# Patient Record
Sex: MALE | Race: BLACK OR AFRICAN AMERICAN | NOT HISPANIC OR LATINO | Employment: OTHER | URBAN - METROPOLITAN AREA
[De-identification: names, ages, dates, MRNs, and addresses within clinical notes are randomized per-mention and may not be internally consistent; named-entity substitution may affect disease eponyms.]

---

## 2018-09-12 ENCOUNTER — OFFICE VISIT CONVERTED (OUTPATIENT)
Dept: FAMILY MEDICINE CLINIC | Facility: CLINIC | Age: 83
End: 2018-09-12
Attending: NURSE PRACTITIONER

## 2018-09-12 ENCOUNTER — CONVERSION ENCOUNTER (OUTPATIENT)
Dept: FAMILY MEDICINE CLINIC | Facility: CLINIC | Age: 83
End: 2018-09-12

## 2019-04-25 ENCOUNTER — OFFICE VISIT CONVERTED (OUTPATIENT)
Dept: CARDIOLOGY | Facility: CLINIC | Age: 84
End: 2019-04-25
Attending: INTERNAL MEDICINE

## 2019-08-08 ENCOUNTER — OFFICE VISIT CONVERTED (OUTPATIENT)
Dept: CARDIOLOGY | Facility: CLINIC | Age: 84
End: 2019-08-08
Attending: INTERNAL MEDICINE

## 2019-08-08 ENCOUNTER — CONVERSION ENCOUNTER (OUTPATIENT)
Dept: CARDIOLOGY | Facility: CLINIC | Age: 84
End: 2019-08-08

## 2019-10-17 ENCOUNTER — HOSPITAL ENCOUNTER (OUTPATIENT)
Dept: GASTROENTEROLOGY | Facility: HOSPITAL | Age: 84
Setting detail: HOSPITAL OUTPATIENT SURGERY
Discharge: HOME | End: 2019-10-17
Attending: INTERNAL MEDICINE

## 2019-11-13 ENCOUNTER — HOSPITAL ENCOUNTER (OUTPATIENT)
Dept: URGENT CARE | Facility: CLINIC | Age: 84
Discharge: HOME | End: 2019-11-13
Attending: PHYSICIAN ASSISTANT

## 2020-04-29 ENCOUNTER — TELEPHONE CONVERTED (OUTPATIENT)
Dept: CARDIOLOGY | Facility: CLINIC | Age: 85
End: 2020-04-29
Attending: INTERNAL MEDICINE

## 2020-05-08 ENCOUNTER — CONVERSION ENCOUNTER (OUTPATIENT)
Dept: SURGERY | Facility: CLINIC | Age: 85
End: 2020-05-08

## 2020-05-08 ENCOUNTER — OFFICE VISIT CONVERTED (OUTPATIENT)
Dept: SURGERY | Facility: CLINIC | Age: 85
End: 2020-05-08
Attending: SURGERY

## 2020-07-14 ENCOUNTER — HOSPITAL ENCOUNTER (OUTPATIENT)
Dept: OTHER | Facility: HOSPITAL | Age: 85
Discharge: HOME | End: 2020-07-14
Attending: INTERNAL MEDICINE

## 2020-09-04 ENCOUNTER — HOSPITAL ENCOUNTER (OUTPATIENT)
Dept: OTHER | Facility: HOSPITAL | Age: 85
Discharge: HOME | End: 2020-09-04
Attending: SURGERY

## 2020-09-04 ENCOUNTER — HOSPITAL ENCOUNTER (OUTPATIENT)
Dept: PREADMISSION TESTING | Facility: HOSPITAL | Age: 85
Discharge: HOME | End: 2020-09-04
Attending: SURGERY

## 2020-09-04 LAB
ANION GAP SERPL CALC-SCNC: 12 MMOL/L (ref 8–19)
BUN SERPL-MCNC: 16 MG/DL (ref 5–25)
BUN/CREAT SERPL: 19 {RATIO} (ref 6–20)
CALCIUM SERPL-MCNC: 9.8 MG/DL (ref 8.7–10.4)
CHLORIDE SERPL-SCNC: 104 MMOL/L (ref 99–111)
CONV CO2: 27 MMOL/L (ref 22–32)
CREAT UR-MCNC: 0.86 MG/DL (ref 0.7–1.2)
GFR SERPLBLD BASED ON 1.73 SQ M-ARVRAT: >60 ML/MIN/{1.73_M2}
GLUCOSE SERPL-MCNC: 90 MG/DL (ref 70–99)
OSMOLALITY SERPL CALC.SUM OF ELEC: 289 MOSM/KG (ref 273–304)
POTASSIUM SERPL-SCNC: 4.2 MMOL/L (ref 3.5–5.3)
SARS-COV-2 RNA SPEC QL NAA+PROBE: NOT DETECTED
SODIUM SERPL-SCNC: 139 MMOL/L (ref 135–147)

## 2020-09-09 ENCOUNTER — HOSPITAL ENCOUNTER (OUTPATIENT)
Dept: PERIOP | Facility: HOSPITAL | Age: 85
Setting detail: HOSPITAL OUTPATIENT SURGERY
Discharge: HOME | End: 2020-09-09
Attending: SURGERY

## 2020-09-22 ENCOUNTER — OFFICE VISIT CONVERTED (OUTPATIENT)
Dept: SURGERY | Facility: CLINIC | Age: 85
End: 2020-09-22
Attending: SURGERY

## 2020-11-11 ENCOUNTER — OFFICE VISIT CONVERTED (OUTPATIENT)
Dept: CARDIOLOGY | Facility: CLINIC | Age: 85
End: 2020-11-11
Attending: INTERNAL MEDICINE

## 2020-11-11 ENCOUNTER — CONVERSION ENCOUNTER (OUTPATIENT)
Dept: OTHER | Facility: HOSPITAL | Age: 85
End: 2020-11-11

## 2021-05-10 ENCOUNTER — OFFICE VISIT CONVERTED (OUTPATIENT)
Dept: CARDIOLOGY | Facility: CLINIC | Age: 86
End: 2021-05-10
Attending: INTERNAL MEDICINE

## 2021-05-10 ENCOUNTER — CONVERSION ENCOUNTER (OUTPATIENT)
Dept: CARDIOLOGY | Facility: CLINIC | Age: 86
End: 2021-05-10

## 2021-05-10 NOTE — H&P
History and Physical      Patient Name: Ramez Pastor   Patient ID: [id number]   Sex: Male   YOB: 1932    Primary Care Provider: Kimberley RAMOS    Visit Date: May 8, 2020    Provider: Issac Barajas MD   Location[facility]   Location Address: [address] [place] [address]  [id number]   Location Phone: [phone]          Chief Complaint  · Outpatient History & Physical / Surgical Orders  · Hernia Consult      History Of Present Illness     Mr. Pastor came in today for evaluation. He is a really nice gentleman that we have taken care of in the past. He has a symptomatic left inguinal hernia. At times, it is a bit sore. He would like to consider getting this fixed a little bit later this year.       Past Medical History  Arthritis; Cancer of prostate; Cataract; Glaucoma; Head injury; Hypertension; Shortness of Breath; Sinus trouble         Past Surgical History  Colectomy with end colostomy; Cystoscopy; EGD; Exploratory laparotomy; EYE SURGERY; Transurethral resection of the prostate         Medication List  amlodipine 5 mg oral tablet; back brace miscellaneous misc; bimatoprost 0.03 % ophthalmic (eye) drops; Butt Paste Zinc/HC1%/Clotrim 1%; Cosopt (PF) 2-0.5 % ophthalmic (eye) dropperette; diclofenac sodium 75 mg oral tablet,delayed release (DR/EC); Eligard (6 month) 45 mg subcutaneous syringe; Entresto 24-26 mg oral tablet; fluticasone 50 mcg/actuation nasal spray,suspension; furosemide 40 mg oral tablet; Men's Multivitamin 400- mcg oral tablet; Oysco 500/D 500 mg(1,250mg) -200 unit oral tablet; potassium chloride 10 mEq oral capsule, extended release; ProAir HFA 90 mcg/actuation inhalation HFA aerosol inhaler; Puralube 85-15 % ophthalmic (eye) ointment; Refresh Tears 0.5 % ophthalmic (eye) drops         Allergy List  NO KNOWN DRUG ALLERGIES         Family Medical History  *No Known Family History         Social History  Alcohol (Never); lives alone; ; Retired [employer];  "Tobacco (Never)         Review of Systems  · Integument  o Admits  o : skin lesion or lump      Vitals  Date Time BP Position Site L\R Cuff Size HR RR TEMP (F) WT  HT  BMI kg/m2 BSA m2 O2 Sat        05/08/2020 10:03 AM       12  130lbs 0oz 5'  9\" 19.2 1.69           Physical Examination  · Constitutional  o Appearance  o : well-nourished, well developed, alert, in no acute distress  · Head and Face  o Head  o :   § Inspection  § : atraumatic, normocephalic  · Neck  o Inspection/Palpation  o : supple, normal range of motion  · Respiratory  o Inspection of Chest  o : normal inspection  o Auscultation of Lungs  o : breath sounds normal, no distress, clear to ascultate bilaterally  · Cardiovascular  o Heart  o :   § Auscultation of Heart  § : regular rate and rhythm, no murmur, gallop or rub  · Gastrointestinal  o Abdominal Examination  o : normal bowel sounds, non-tender, soft  · Groin  o Groin  o : LIH noted          Assessment  · Pre-Surgical Orders     V72.84  · Non-recurrent unilateral inguinal hernia without obstruction or gangrene     550.90/K40.90      Plan  · Orders  o Surgery Order (GEN[facility]) - 550.90/K40.90 - 09/09/2020  · Medications  o Medications have been Reconciled  o Transition of Care or Provider Policy  · Instructions  o PLAN:  o Handouts Provided-Pre-Procedure Instructions including date and time and location of procedure.  o Surgical Facility: [facility]  o ****Surgical Orders****  o ****Patient Status****  o Outpatient  o RISK AND BENEFITS:  o Consent for surgery: Given these options, the patient has verbally expressed an understanding of the risks of surgery and finds these risks acceptable. We will proceed with surgery as soon as possible.  o Consult Anesthesia for any post-operative block, or any pain management procedure deemed necessary by the anestesiologist for adequate post-operative pain control.   o O.R. PREP: Per protocol  o SCD's preoperatively  o PLEASE SIGN PERMIT FOR: " Left inguinal hernia repair  o *__Kefzol 2 gram IV on call to OR.  o *___The above History and Physical Examination has been completed within 30 days of admission.  o Electronically Identified Patient Education Materials Provided Electronically     We are going to set him up for an open repair of his inguinal hernia. He has had a prior midline laparotomy for a cecal volvulus. I have described the procedure to him as well as the risks and benefits and he is agreeable to proceeding.             Electronically Signed by: Maddy Hou-, -Author on May 11, 2020 02:20:48 PM  Electronically Co-signed by: Issac Barajas MD -Reviewer on May 12, 2020 02:14:07 PM

## 2021-05-13 NOTE — PROGRESS NOTES
"   Progress Note      Patient Name: Ramez Pastor   Patient ID: [id number]   Sex: Male   YOB: 1932    Primary Care Provider: Kimberley RAMOS   Referring Provider: Kolby Jarrett MD    Visit Date: November 11, 2020    Provider: Kolby Jarrett MD   Location: [facility]   Location Address: [address] [place] [address], [facility]   [place]  [id number]   Location Phone: [phone]          Chief Complaint  · Congestive heart failure       History Of Present Illness  REFERRING CARE PROVIDER: Kolby Jarrett MD   Ramez Pastor is an 88 year old /Black gentleman with a known history of congestive heart failure probable non-ischemic, hypertension and ICD, who has been doing fairly well. Stable shortness of breath on exertion symptoms. No weight gain or edema problems.   PAST MEDICAL HISTORY: Arthritis; Congestive heart failure, systolic; Hypertension; ICD implantation; Prostate cancer.   PSYCHOSOCIAL HISTORY: He never used alcohol or tobacco.   CURRENT MEDICATIONS: include Lasix 80 mg daily; Potassium 10 mEq daily; Coreg 6.25 mg b.i.d; Vitamin D3; Entresto 24/26 mg b.i.d.; acetaminophen. The dosage and frequency of the medications were reviewed with the patient.       Review of Systems  · Cardiovascular  o Denies  o : palpitations (fast, fluttering, or skipping beats), swelling (feet, ankles, hands), shortness of breath while walking or lying flat, chest pain or angina pectoris   · Respiratory  o Denies  o : chronic or frequent cough, asthma or wheezing      Vitals  Date Time BP Position Site L\R Cuff Size HR RR TEMP (F) WT  HT  BMI kg/m2 BSA m2 O2 Sat FR L/min FiO2 HC       11/11/2020 11:03 /82 Sitting    96 - R   129lbs 0oz 5'  9\" 19.05 1.69       11/11/2020 11:03 /84 Sitting                       Physical Examination  · Constitutional  o Appearance  o : Awake, alert, in no acute distress.   · Eyes  o Conjunctivae  o : Normal.  · Ears, Nose, Mouth and Throat  o Oral Cavity  o : "   § Oral Mucosa  § : Normal.  · Neck  o Inspection/Palpation  o : No JVD. Good carotid upstroke. No thyromegaly.  · Respiratory  o Respiratory  o : Good respiratory effort. Clear to percussion and auscultation.  · Cardiovascular  o Heart  o :   § Auscultation of Heart  § : S1, S2 normal. Regular rate and rhythm without murmurs, gallops, or rubs.  o Peripheral Vascular System  o :   § Extremities  § : Good femoral and pedal pulses. Trace lower-extremity edema bilaterally.  · Gastrointestinal  o Abdominal Examination  o : Soft. No tenderness or masses felt. No hepatosplenomegaly. Abdominal aorta is not palpable.     His dual-chamber ICD was interrogated.  The right atrial lead was paced 9% of the time and working normally.  The right ventricular lead was paced 100% of the time and working normally.  No changes were made to the device.  No events recorded.           Assessment     ASSESSMENT AND PLAN:    1.  Non-ischemic cardiomyopathy:  Class II to III symptoms.  No heart failure admissions since his last visit. Will       increase Entresto to 49/51 mg b.i.d.  Repeat a renal panel in 2 weeks.  2. ICD, dual-chamber, working properly:  Repeat interrogation in 6 months.    MD Silvia Luque/holden         This note was transcribed by Julianna Whitman.  holden/silvia   The above service was transcribed by Julianna Whitman, and I attest to the accuracy of the note.  SILVIA.               Electronically Signed by: Julianna Whitman-, -Author on November 16, 2020 10:08:36 AM  Electronically Co-signed by: Kolby Jarrett MD -Reviewer on November 18, 2020 04:08:04 PM

## 2021-05-13 NOTE — PROGRESS NOTES
Progress Note      Patient Name: Ramez Pastor   Patient ID: [id number]   Sex: Male   YOB: 1932    Primary Care Provider: Kimberley RAMOS    Visit Date: September 22, 2020    Provider: Issac Barajas MD   Location: [facility]   Location Address: [address] [place] [address]  [id number]   Location Phone: [phone]          Chief Complaint  · Follow Up Office Visit      History Of Present Illness     Mr. Pastor came back for follow-up. He is doing well following a left inguinal hernia. He denies any pain or any other troubles.       Past Medical History  Arthritis; Cancer of prostate; Cataract; Glaucoma; Head injury; Hypertension; Shortness of Breath; Sinus trouble         Past Surgical History  Colectomy with end colostomy; Cystoscopy; EGD; Exploratory laparotomy; EYE SURGERY; Transurethral resection of the prostate         Medication List  amlodipine 5 mg oral tablet; back brace miscellaneous misc; bimatoprost 0.03 % ophthalmic (eye) drops; Butt Paste Zinc/HC1%/Clotrim 1%; Cosopt (PF) 2-0.5 % ophthalmic (eye) dropperette; diclofenac sodium 75 mg oral tablet,delayed release (DR/EC); Eligard (6 month) 45 mg subcutaneous syringe; Entresto 24-26 mg oral tablet; fluticasone 50 mcg/actuation nasal spray,suspension; furosemide 40 mg oral tablet; Men's Multivitamin 400- mcg oral tablet; Oysco 500/D 500 mg(1,250mg) -200 unit oral tablet; potassium chloride 10 mEq oral capsule, extended release; ProAir HFA 90 mcg/actuation inhalation HFA aerosol inhaler; Puralube 85-15 % ophthalmic (eye) ointment; Refresh Tears 0.5 % ophthalmic (eye) drops         Allergy List  NO KNOWN DRUG ALLERGIES         Family Medical History  *No Known Family History         Social History  Alcohol (Never); lives alone; ; Retired [employer]; Tobacco (Never)         Review of Systems  · Cardiovascular  o Denies  o : chest pain, irregular heart beats, rapid heart rate, chest pain on exertion,  "shortness of breath, lower extremity swelling  · Respiratory  o Denies  o : shortness of breath, wheezing, cough, wheezing, chronic cough, coughing up blood  · Gastrointestinal  o Denies  o : nausea, vomiting, diarrhea, chronic abdominal pain, reflux symptoms      Vitals  Date Time BP Position Site L\R Cuff Size HR RR TEMP (F) WT  HT  BMI kg/m2 BSA m2 O2 Sat HC       09/22/2020 03:05 PM       16  129lbs 0oz 5'  9\" 19.05 1.69           Physical Examination     Today on physical exam, the incision looks good. It is healing up fine.           Assessment  · Postoperative Exam Following Surgery     V67.00       Doing well status post left inguinal hernia repair.       Plan  · Medications  o Medications have been Reconciled  o Transition of Care or Provider Policy  · Instructions  o Follow up as needed.            Electronically Signed by: Maddy Hou-, -Author on September 23, 2020 12:12:04 PM  Electronically Co-signed by: Issac Barajas MD -Reviewer on September 25, 2020 09:09:17 AM  "

## 2021-05-13 NOTE — PROGRESS NOTES
Progress Note      Patient Name: Ramez Pastor   Patient ID: [id number]   Sex: Male   YOB: 1932    Primary Care Provider: Kimberley RAMOS   Referring Provider: Kimberley RAMOS    Visit Date: April 29, 2020    Provider: Kolby Jarrett MD   Location: [facility]   Location Address: [address] [place] [address], [facility]   [place]  [id number]   Location Phone: [phone]          Chief Complaint  · Congestive heart failure.       History Of Present Illness  Video Conferencing Visit  Ramez Pastor is an 87 year old /Black male with systolic congestive heart failure, hypertension, and an ICD who is doing well. He has stable shortness of breath, chronic palpitations, and mild lower extremity edema. No chest pain. He is presenting for evaluation via video conferencing. Verbal consent obtained before beginning visit. Telehealth visit due to COVID-19.   The following staff were present during this visit: Provider only.   PAST MEDICAL HISTORY: Arthritis; Congestive heart failure, systolic; Hypertension; ICD implantation; Prostate cancer.   FAMILY HISTORY: Positive for hypertension. Negative for diabetes mellitus or heart disease.   PSYCHOSOCIAL HISTORY: Denies mood changes or depression. Denies alcohol or tobacco use. Does push-ups and jogs for exercise.   CURRENT MEDICATIONS: Lasix 80 mg daily; potassium chloride 10 mEq daily; Coreg 6.25 mg b.i.d.; vitamin D3 1,000 units daily; Entresto 24-26 mg b.i.d.; acetaminophen 500 mg 2 tablets t.i.d. The dosage and frequency of the medications were reviewed with the patient.       Review of Systems  · Cardiovascular  o Admits  o : palpitations (fast, fluttering, or skipping beats), swelling (feet, ankles, hands), shortness of breath while walking or lying flat  o Denies  o : chest pain or angina pectoris   · Respiratory  o Denies  o : chronic or frequent cough, asthma or wheezing      Vitals     Patient unable to obtain at-home  vitals.           Assessment     ASSESSMENT & PLAN:    1.  Congestive heart failure, systolic, class III symptoms, stable.  Continue with his current dose of Lasix, as         well as his other medications.  Counseled on keeping a daily weight and blood pressure log.    2.  ICD, previously working well.  Repeat interrogation on his followup visit in 6 months.  3.  Hypertension.  Counseled him on getting a home blood pressure log for monitoring.                 Electronically Signed by: Abril Taylor-, Other -Author on May 4, 2020 09:15:33 AM  Electronically Co-signed by: Kolby Jarrett MD -Reviewer on May 5, 2020 09:04:56 AM

## 2021-05-14 VITALS — WEIGHT: 129 LBS | RESPIRATION RATE: 16 BRPM | HEIGHT: 69 IN | BODY MASS INDEX: 19.11 KG/M2

## 2021-05-14 VITALS
WEIGHT: 129 LBS | SYSTOLIC BLOOD PRESSURE: 148 MMHG | HEART RATE: 96 BPM | BODY MASS INDEX: 19.11 KG/M2 | HEIGHT: 69 IN | DIASTOLIC BLOOD PRESSURE: 82 MMHG

## 2021-05-15 VITALS
HEART RATE: 70 BPM | DIASTOLIC BLOOD PRESSURE: 88 MMHG | BODY MASS INDEX: 19.11 KG/M2 | WEIGHT: 129 LBS | SYSTOLIC BLOOD PRESSURE: 144 MMHG | HEIGHT: 69 IN

## 2021-05-15 VITALS
DIASTOLIC BLOOD PRESSURE: 74 MMHG | HEIGHT: 69 IN | SYSTOLIC BLOOD PRESSURE: 126 MMHG | WEIGHT: 130 LBS | HEART RATE: 58 BPM | BODY MASS INDEX: 19.26 KG/M2

## 2021-05-15 VITALS — RESPIRATION RATE: 12 BRPM | WEIGHT: 130 LBS | HEIGHT: 69 IN | BODY MASS INDEX: 19.26 KG/M2

## 2021-05-16 VITALS
DIASTOLIC BLOOD PRESSURE: 70 MMHG | SYSTOLIC BLOOD PRESSURE: 132 MMHG | HEIGHT: 69 IN | WEIGHT: 130.5 LBS | SYSTOLIC BLOOD PRESSURE: 140 MMHG | OXYGEN SATURATION: 94 % | DIASTOLIC BLOOD PRESSURE: 70 MMHG | TEMPERATURE: 97.6 F | HEART RATE: 76 BPM | BODY MASS INDEX: 19.33 KG/M2

## 2021-06-05 NOTE — PROGRESS NOTES
"   Progress Note      Patient Name: Ramez Pastor   Patient ID: [id number]   Sex: Male   YOB: 1932    Primary Care Provider: Kimberley RAMOS    Visit Date: May 10, 2021    Provider: Kolby Jarrett MD   Location: [facility]   Location Address: [address] [place] [address], [facility]   [place]  [id number]   Location Phone: [phone]          Chief Complaint     CHF.       History Of Present Illness  REFERRING CARE PROVIDER: REFERRING CARE PROVIDER NAME   Ramez Pastor is a 88 year old /Black male with a history of systolic congestive heart failure, nonischemic hypertension and ICD who has been doing. Stable symptoms of shortness of breath. No weight gain or edema. No ICD discharges.   PAST MEDICAL HISTORY: Arthritis; Congestive heart failure, systolic; Hypertension; ICD implantation; Prostate cancer.   PSYCHOSOCIAL HISTORY: Denies alcohol consumption. Denies tobacco use.   CURRENT MEDICATIONS: Medications have been reviewed and are as stated.      ALLERGIES:  No known drug allergies.       Review of Systems  · Cardiovascular  o Admits  o : shortness of breath while walking or lying flat  o Denies  o : palpitations (fast, fluttering, or skipping beats), swelling (feet, ankles, hands), chest pain or angina pectoris   · Respiratory  o Denies  o : chronic or frequent cough      Vitals  Date Time BP Position Site L\R Cuff Size HR RR TEMP (F) WT  HT  BMI kg/m2 BSA m2 O2 Sat FR L/min FiO2 HC       05/10/2021 12:07 /80 Sitting    90 - R   124lbs 0oz 5'  9\" 18.31 1.65             Physical Examination  · Constitutional  o Appearance  o : Awake, alert, in no acute distress.   · Eyes  o Conjunctivae  o : Normal.  · Ears, Nose, Mouth and Throat  o Oral Cavity  o :   § Oral Mucosa  § : Normal.  · Neck  o Inspection/Palpation  o : No JVD. Good carotid upstroke. No thyromegaly.  · Respiratory  o Respiratory  o : Good respiratory effort. Clear to percussion and " auscultation.  · Cardiovascular  o Heart  o :   § Auscultation of Heart  § : S1, S2 normal. Regular rate and rhythm without murmurs, gallops, or rubs.  o Peripheral Vascular System  o :   § Extremities  § : Good femoral and pedal pulses. No pedal edema.  · Gastrointestinal  o Abdominal Examination  o : Soft. No tenderness or masses felt. No hepatosplenomegaly. Abdominal aorta is not palpable.  · Device Interrogation  o Device Interrogation  o : ICD dual chamber was interrogated by me. Found to be working properly. No changes made in the device.          Assessment     1.  Nonischemic cardiomyopathy, stable symptoms. Continue with his Coreg 3.125 b.i.d. and Entresto 24-26 b.i.d. dosing as well as Lasix 80 mg for volume control. I recommend keeping daily weight logs.   2.  ICD interrogated by me, working properly, repeat interrogation in 6 months.         Kolby Jarrett MD  /               Electronically Signed by: Lyn Gaston-, OT -Author on May 11, 2021 06:33:11 AM  Electronically Co-signed by: Kolby Jarrett MD -Reviewer on May 12, 2021 03:38:56 PM

## 2021-07-15 VITALS
BODY MASS INDEX: 18.37 KG/M2 | SYSTOLIC BLOOD PRESSURE: 138 MMHG | WEIGHT: 124 LBS | DIASTOLIC BLOOD PRESSURE: 80 MMHG | HEART RATE: 90 BPM | HEIGHT: 69 IN

## 2021-11-22 RX ORDER — SACUBITRIL AND VALSARTAN 24; 26 MG/1; MG/1
TABLET, FILM COATED ORAL
Qty: 180 TABLET | Refills: 1 | Status: SHIPPED | OUTPATIENT
Start: 2021-11-22 | End: 2021-12-20 | Stop reason: SDUPTHER

## 2021-11-22 NOTE — TELEPHONE ENCOUNTER
Patient last OV 05.10.21     Medication was documented at that visit.       Next OV   none schedule

## 2021-12-20 RX ORDER — SACUBITRIL AND VALSARTAN 24; 26 MG/1; MG/1
1 TABLET, FILM COATED ORAL 2 TIMES DAILY
Qty: 180 TABLET | Refills: 1 | Status: SHIPPED | OUTPATIENT
Start: 2021-12-20 | End: 2022-01-20 | Stop reason: SDUPTHER

## 2021-12-20 NOTE — TELEPHONE ENCOUNTER
Patient last OV 05.10.21     Medication was documented at that visit.       Next OV none scheduled

## 2022-01-20 RX ORDER — SACUBITRIL AND VALSARTAN 24; 26 MG/1; MG/1
1 TABLET, FILM COATED ORAL 2 TIMES DAILY
Qty: 180 TABLET | Refills: 1 | Status: SHIPPED | OUTPATIENT
Start: 2022-01-20 | End: 2022-08-11 | Stop reason: SDUPTHER

## 2022-02-20 PROBLEM — I50.22 CHRONIC HFREF (HEART FAILURE WITH REDUCED EJECTION FRACTION): Status: ACTIVE | Noted: 2022-02-20

## 2022-02-20 PROBLEM — I10 ESSENTIAL HYPERTENSION: Status: ACTIVE | Noted: 2022-02-20

## 2022-02-20 PROBLEM — Z95.810 ICD (IMPLANTABLE CARDIOVERTER-DEFIBRILLATOR) IN PLACE: Status: ACTIVE | Noted: 2022-02-20

## 2022-02-22 ENCOUNTER — CLINICAL SUPPORT NO REQUIREMENTS (OUTPATIENT)
Dept: CARDIOLOGY | Facility: CLINIC | Age: 87
End: 2022-02-22

## 2022-02-22 ENCOUNTER — OFFICE VISIT (OUTPATIENT)
Dept: CARDIOLOGY | Facility: CLINIC | Age: 87
End: 2022-02-22

## 2022-02-22 VITALS
BODY MASS INDEX: 19.7 KG/M2 | HEIGHT: 69 IN | HEART RATE: 67 BPM | SYSTOLIC BLOOD PRESSURE: 125 MMHG | DIASTOLIC BLOOD PRESSURE: 91 MMHG | WEIGHT: 133 LBS

## 2022-02-22 DIAGNOSIS — I50.22 CHRONIC HFREF (HEART FAILURE WITH REDUCED EJECTION FRACTION): ICD-10-CM

## 2022-02-22 DIAGNOSIS — Z95.810 ICD (IMPLANTABLE CARDIOVERTER-DEFIBRILLATOR) IN PLACE: Primary | ICD-10-CM

## 2022-02-22 DIAGNOSIS — I10 ESSENTIAL HYPERTENSION: ICD-10-CM

## 2022-02-22 DIAGNOSIS — I44.2 CHB (COMPLETE HEART BLOCK): ICD-10-CM

## 2022-02-22 DIAGNOSIS — I50.22 CHRONIC HFREF (HEART FAILURE WITH REDUCED EJECTION FRACTION): Primary | ICD-10-CM

## 2022-02-22 DIAGNOSIS — Z95.810 ICD (IMPLANTABLE CARDIOVERTER-DEFIBRILLATOR) IN PLACE: ICD-10-CM

## 2022-02-22 PROCEDURE — 93283 PRGRMG EVAL IMPLANTABLE DFB: CPT | Performed by: INTERNAL MEDICINE

## 2022-02-22 PROCEDURE — 99214 OFFICE O/P EST MOD 30 MIN: CPT | Performed by: INTERNAL MEDICINE

## 2022-02-22 RX ORDER — DICLOFENAC SODIUM 75 MG/1
TABLET, DELAYED RELEASE ORAL 2 TIMES DAILY
COMMUNITY
End: 2023-03-01

## 2022-02-22 RX ORDER — CALCIUM CARBONATE/VITAMIN D3 500MG-5MCG
TABLET ORAL
COMMUNITY

## 2022-02-22 RX ORDER — PSEUDOEPHED/ACETAMINOPH/DIPHEN 30MG-500MG
TABLET ORAL AS NEEDED
COMMUNITY
Start: 2021-12-21

## 2022-02-22 RX ORDER — POTASSIUM CHLORIDE 750 MG/1
10 CAPSULE, EXTENDED RELEASE ORAL DAILY
COMMUNITY
Start: 2021-04-01

## 2022-02-22 RX ORDER — LEUPROLIDE ACETATE 45 MG
45 KIT SUBCUTANEOUS
COMMUNITY
End: 2023-03-01

## 2022-02-22 RX ORDER — ALBUTEROL SULFATE 90 UG/1
AEROSOL, METERED RESPIRATORY (INHALATION) AS NEEDED
COMMUNITY

## 2022-02-22 RX ORDER — BROMPHENIRAMIN/PSEUDOEPHEDRINE 12MG-120MG
1 CAPSULE, EXTENDED RELEASE ORAL
COMMUNITY

## 2022-02-22 RX ORDER — LIGHT MINERAL OIL, WHITE PETROLATUM 150; 850 MG/G; MG/G
OINTMENT OPHTHALMIC
COMMUNITY

## 2022-02-22 RX ORDER — LORATADINE 10 MG/1
TABLET ORAL DAILY
COMMUNITY
Start: 2021-12-21

## 2022-02-22 RX ORDER — CHLORHEXIDINE GLUCONATE 0.12 MG/ML
RINSE ORAL
COMMUNITY
Start: 2021-11-23

## 2022-02-22 RX ORDER — BIMATOPROST 0.3 MG/ML
1 SOLUTION/ DROPS OPHTHALMIC
COMMUNITY
End: 2023-03-01

## 2022-02-22 RX ORDER — FLUTICASONE PROPIONATE 50 MCG
SPRAY, SUSPENSION (ML) NASAL
COMMUNITY

## 2022-02-22 RX ORDER — CARVEDILOL 3.12 MG/1
3.12 TABLET ORAL 2 TIMES DAILY
COMMUNITY
Start: 2022-01-06

## 2022-02-22 RX ORDER — CARBOXYMETHYLCELLULOSE SODIUM 5 MG/ML
1 SOLUTION/ DROPS OPHTHALMIC
COMMUNITY

## 2022-02-22 NOTE — ASSESSMENT & PLAN NOTE
ICD working properly no changes made device repeat interrogation in 1 year with routine home monitoring

## 2022-02-22 NOTE — PROGRESS NOTES
Normal Dual ICD Device Interrogation and Device Testing.  Normal evaluation of device function and lead measurements.  No optimization was needed of parameters or maximization of device longevity.  Patient is on automated Home Remote Monitoring. Remaining battery is 5 years and he is Dependent.

## 2022-02-22 NOTE — PROGRESS NOTES
Chief Complaint  Hypertension, Congestive Heart Failure, and ICD    Subjective    Patient stable from a respiratory standpoint no issues with weight gain    Past Medical History:   Diagnosis Date   • Arthritis    • Back injury    • Bronchitis    • Cataract    • CHF (congestive heart failure) (HCC)    • Circulatory disorder    • Colon polyp    • Enlarged prostate    • Genitourinary disorder     UTI SEVERAL YRS AGO INCONTINENCE   • Glaucoma    • Hemorrhoids    • Hypertension     ON MEDS   • Musculoskeletal disorder    • Myocardial infarction (HCC)    • Neurologic disorder     SHRAPNAL IN [place]   • Pneumonia    • Seizures (HCC)     WITH HEAD INJURY IN [place]   • Skin problem     BLISTERING FROM AGENT ORANGE AFTER [place]   • Syncopal episodes          Current Outpatient Medications:   •  Acetaminophen Extra Strength 500 MG tablet, As Needed., Disp: , Rfl:   •  albuterol sulfate HFA (ProAir HFA) 108 (90 Base) MCG/ACT inhaler, As Needed., Disp: , Rfl:   •  artificial tears (PURALUBE) 85-15 % ointment ophthalmic ointment, Puralube 85-15 % ophthalmic (eye) ointment apply a small amount to affected eye  once a day (at bedtime)   Active, Disp: , Rfl:   •  bimatoprost (LUMIGAN) 0.03 % ophthalmic drops, 1 drop., Disp: , Rfl:   •  Calcium Carb-Cholecalciferol (OYSCO 500 + D) 500-200 MG-UNIT tablet, Oysco 500/D 500 mg(1,250mg) -200 unit oral tablet take 1 tablet by oral route 2 times a day   Active, Disp: , Rfl:   •  carboxymethylcellulose (REFRESH PLUS) 0.5 % solution, 1 drop., Disp: , Rfl:   •  carvedilol (COREG) 3.125 MG tablet, Take 3.125 mg by mouth 2 (Two) Times a Day., Disp: , Rfl:   •  chlorhexidine (PERIDEX) 0.12 % solution, , Disp: , Rfl:   •  diclofenac (VOLTAREN) 75 MG EC tablet, 2 (Two) Times a Day., Disp: , Rfl:   •  Dorzolamide HCl-Timolol Mal PF (Cosopt PF) 22.3-6.8 MG/ML ophthalmic solution, 1 drop., Disp: , Rfl:   •  fluticasone (FLONASE) 50 MCG/ACT nasal spray, fluticasone 50 mcg/actuation nasal  "spray,suspension spray 2 sprays (100 mcg) in each nostril by intranasal route once daily   Active, Disp: , Rfl:   •  leuprolide (Eligard) 45 MG injection, 45 mg., Disp: , Rfl:   •  loratadine (CLARITIN) 10 MG tablet, Daily., Disp: , Rfl:   •  Nutritional Supplements (ENSURE COMPLETE PO), Take  by mouth 2 (Two) Times a Day As Needed., Disp: , Rfl:   •  potassium chloride (MICRO-K) 10 MEQ CR capsule, Take 10 mEq by mouth Daily., Disp: , Rfl:   •  sacubitril-valsartan (Entresto) 24-26 MG tablet, Take 1 tablet by mouth 2 (Two) Times a Day., Disp: 180 tablet, Rfl: 1    There are no discontinued medications.  No Known Allergies     Social History     Tobacco Use   • Smoking status: Never Smoker   • Smokeless tobacco: Never Used   Vaping Use   • Vaping Use: Never used   Substance Use Topics   • Alcohol use: Never   • Drug use: Never       Family History   Problem Relation Age of Onset   • Asthma Mother    • Prostate cancer Father         Objective     /91   Pulse 67   Ht 175.3 cm (69\")   Wt 60.3 kg (133 lb)   BMI 19.64 kg/m²       Physical Exam    General Appearance:   · no acute distress  · Alert and oriented x3  HENT:   · lips not cyanotic  · Atraumatic  Neck:  · No jvd   · supple  Respiratory:  · no respiratory distress  · normal breath sounds  · no rales  Cardiovascular:  · Regular rate and rhythm  · no S3, no S4   · no murmur  · no rub  Extremities  · No cyanosis  · lower extremity edema: Mild  Skin:   · warm, dry  · No rashes      Result Review :     No results found for: PROBNP       No results found for: TSH   No results found for: FREET4   No results found for: DDIMERQUANT  Magnesium   Date Value Ref Range Status   02/04/2019 2.06 1.60 - 2.30 mg/dL Final      No results found for: DIGOXIN   Lab Results   Component Value Date    TROPONINT 0.01 01/12/2021             Lab Results   Component Value Date    POCTROP 0.10 (H) 04/09/2019     Dual-chamber ICD was interrogated with right atrial lead pacing 6% time " working normally the right ventricular is paced 98.3% time working normally battery life estimated 5.1 years no events recorded                   Diagnoses and all orders for this visit:    1. Chronic HFrEF (heart failure with reduced ejection fraction) ([facility]) (Primary)  Assessment & Plan:  Patient stable from a volume standpoint and symptoms continue with her current dose of Entresto 24-26 twice daily and Coreg 3.25 twice daily.      2. Essential hypertension  Assessment & Plan:  Well-controlled on her dose of Coreg 3.125 twice daily and Entresto 2426 twice daily dosing      3. ICD (implantable cardioverter-defibrillator) in place  Assessment & Plan:  ICD working properly no changes made device repeat interrogation in 1 year with routine home monitoring            Follow Up     Return in about 6 months (around 8/22/2022).          Patient was given instructions and counseling regarding his condition or for health maintenance advice. Please see specific information pulled into the AVS if appropriate.

## 2022-07-18 RX ORDER — FUROSEMIDE 80 MG
TABLET ORAL
COMMUNITY
Start: 2022-04-13

## 2022-07-18 RX ORDER — DORZOLAMIDE HYDROCHLORIDE AND TIMOLOL MALEATE 20; 5 MG/ML; MG/ML
SOLUTION/ DROPS OPHTHALMIC
COMMUNITY
Start: 2022-04-15

## 2022-07-18 RX ORDER — MELATONIN
COMMUNITY
Start: 2022-06-28

## 2022-07-18 RX ORDER — BICALUTAMIDE 50 MG/1
TABLET, FILM COATED ORAL
COMMUNITY
Start: 2022-06-27

## 2022-07-18 RX ORDER — DOCUSATE SODIUM 50 MG AND SENNOSIDES 8.6 MG 8.6; 5 MG/1; MG/1
TABLET, FILM COATED ORAL
COMMUNITY
Start: 2022-06-28

## 2022-07-18 RX ORDER — MEGESTROL ACETATE 20 MG/1
TABLET ORAL
COMMUNITY
Start: 2022-06-29

## 2022-08-01 ENCOUNTER — OFFICE VISIT (OUTPATIENT)
Dept: SURGERY | Facility: CLINIC | Age: 87
End: 2022-08-01

## 2022-08-01 DIAGNOSIS — K40.90 RIGHT INGUINAL HERNIA: Primary | ICD-10-CM

## 2022-08-01 PROCEDURE — 99212 OFFICE O/P EST SF 10 MIN: CPT | Performed by: SURGERY

## 2022-08-01 NOTE — PROGRESS NOTES
Inpatient History and Physical Surgical Orders    Preadmission Location:   Preadmission Time:  Facility:  Surgery Date:  Surgery Time:  Preadmission Test date:     Chief Complaint  Outpatient History and Physical / Surgical Orders    Primary Care Provider: Vannesa Frankel NP-C    Referring Provider: Vannesa Frankel NP-C    Subjective      Patient Name: Ramez Pastor : 1932    HPI  The patient is a 89-year-old gentleman that we have taken care of in the past.  He had a open left inguinal hernia repair done about 2 years ago.  He is done fine from that but is developed a new hernia in the right inguinal canal.  It is not terribly symptomatic.  He occasionally will have a little discomfort there but apparently it does not bother him that often.    Past History:  Medical History: has a past medical history of Arthritis, Back injury, Bronchitis, Cataract, CHF (congestive heart failure) (HCC), Circulatory disorder, Colon polyp, Enlarged prostate, Genitourinary disorder, Glaucoma, Hemorrhoids, Hypertension, Musculoskeletal disorder, Myocardial infarction (HCC), Neurologic disorder, Pneumonia, Seizures (HCC), Skin problem, and Syncopal episodes.   Surgical History: has a past surgical history that includes Mouth surgery; Cataract extraction (Right); Cardiac catheterization (); Abdominal surgery; Hemorrhoid surgery; Cholecystectomy; TURP / transurethral incision / drainage prostate; Leg Surgery; and Cardiac defibrillator placement.   Family History: family history includes Asthma in his mother; Prostate cancer in his father.   Social History: reports that he has never smoked. He has never used smokeless tobacco. He reports that he does not drink alcohol and does not use drugs.  Allergies: Patient has no known allergies.       Current Outpatient Medications:   •  Acetaminophen Extra Strength 500 MG tablet, As Needed., Disp: , Rfl:   •  albuterol sulfate  (90 Base) MCG/ACT inhaler, As Needed., Disp: , Rfl:    •  artificial tears (PURALUBE) 85-15 % ointment ophthalmic ointment, Puralube 85-15 % ophthalmic (eye) ointment apply a small amount to affected eye  once a day (at bedtime)   Active, Disp: , Rfl:   •  bicalutamide (CASODEX) 50 MG chemo tablet, , Disp: , Rfl:   •  bimatoprost (LUMIGAN) 0.03 % ophthalmic drops, 1 drop., Disp: , Rfl:   •  Calcium Carb-Cholecalciferol (OYSCO 500 + D) 500-200 MG-UNIT tablet, Oysco 500/D 500 mg(1,250mg) -200 unit oral tablet take 1 tablet by oral route 2 times a day   Active, Disp: , Rfl:   •  carboxymethylcellulose (REFRESH PLUS) 0.5 % solution, 1 drop., Disp: , Rfl:   •  carvedilol (COREG) 3.125 MG tablet, Take 3.125 mg by mouth 2 (Two) Times a Day., Disp: , Rfl:   •  chlorhexidine (PERIDEX) 0.12 % solution, , Disp: , Rfl:   •  cholecalciferol (VITAMIN D3) 25 MCG (1000 UT) tablet, , Disp: , Rfl:   •  diclofenac (VOLTAREN) 75 MG EC tablet, 2 (Two) Times a Day., Disp: , Rfl:   •  Dorzolamide HCl-Timolol Mal PF (Cosopt PF) 22.3-6.8 MG/ML ophthalmic solution, 1 drop., Disp: , Rfl:   •  dorzolamide-timolol (COSOPT) 22.3-6.8 MG/ML ophthalmic solution, , Disp: , Rfl:   •  fluticasone (FLONASE) 50 MCG/ACT nasal spray, fluticasone 50 mcg/actuation nasal spray,suspension spray 2 sprays (100 mcg) in each nostril by intranasal route once daily   Active, Disp: , Rfl:   •  furosemide (LASIX) 80 MG tablet, , Disp: , Rfl:   •  leuprolide (Eligard) 45 MG injection, 45 mg., Disp: , Rfl:   •  loratadine (CLARITIN) 10 MG tablet, Daily., Disp: , Rfl:   •  megestrol (MEGACE) 20 MG tablet, , Disp: , Rfl:   •  Nutritional Supplements (ENSURE COMPLETE PO), Take  by mouth 2 (Two) Times a Day As Needed., Disp: , Rfl:   •  potassium chloride (MICRO-K) 10 MEQ CR capsule, Take 10 mEq by mouth Daily., Disp: , Rfl:   •  sacubitril-valsartan (Entresto) 24-26 MG tablet, Take 1 tablet by mouth 2 (Two) Times a Day., Disp: 180 tablet, Rfl: 1  •  Stimulant Laxative 8.6-50 MG per tablet, , Disp: , Rfl:        Objective    Vital Signs:   There were no vitals taken for this visit.      Physical Exam  Vitals and nursing note reviewed.   Constitutional:       Appearance: Normal appearance. The patient is well-developed.   Cardiovascular:      Rate and Rhythm: Normal rate and regular rhythm.   Pulmonary:      Effort: Pulmonary effort is normal.      Breath sounds: Normal air entry.   Abdominal:      General: Bowel sounds are normal.      Palpations: Abdomen is soft.      Skin:     General: Skin is warm and dry.   Neurological:      Mental Status: The patient is alert and oriented to person, place, and time.      Motor: Motor function is intact.   Psychiatric:         Mood and Affect: Mood normal.   Groin: Small reducible right inguinal hernia noted    Result Review :               Assessment and Plan   Diagnoses and all orders for this visit:    1. Right inguinal hernia (Primary)    At this point he is not terribly symptomatic from his hernia.  He is fairly frail so I think it would make good sense to follow this expectantly for now.  I have told him that if he were to become more symptomatic from the hernia we could certainly consider a repair in the future and he indicated he was fine with that plan.    I  Issac Barajas MD  08/01/2022

## 2022-08-11 RX ORDER — SACUBITRIL AND VALSARTAN 24; 26 MG/1; MG/1
1 TABLET, FILM COATED ORAL 2 TIMES DAILY
Qty: 180 TABLET | Refills: 0 | Status: SHIPPED | OUTPATIENT
Start: 2022-08-11 | End: 2022-08-12 | Stop reason: SDUPTHER

## 2022-08-12 RX ORDER — SACUBITRIL AND VALSARTAN 24; 26 MG/1; MG/1
1 TABLET, FILM COATED ORAL 2 TIMES DAILY
Qty: 180 TABLET | Refills: 0 | Status: SHIPPED | OUTPATIENT
Start: 2022-08-12 | End: 2022-10-13 | Stop reason: SDUPTHER

## 2022-08-31 ENCOUNTER — OFFICE VISIT (OUTPATIENT)
Dept: CARDIOLOGY | Facility: CLINIC | Age: 87
End: 2022-08-31

## 2022-08-31 VITALS
BODY MASS INDEX: 18.84 KG/M2 | SYSTOLIC BLOOD PRESSURE: 140 MMHG | DIASTOLIC BLOOD PRESSURE: 74 MMHG | WEIGHT: 127.2 LBS | HEIGHT: 69 IN | HEART RATE: 71 BPM

## 2022-08-31 DIAGNOSIS — Z95.810 ICD (IMPLANTABLE CARDIOVERTER-DEFIBRILLATOR) IN PLACE: ICD-10-CM

## 2022-08-31 DIAGNOSIS — I50.22 CHRONIC HFREF (HEART FAILURE WITH REDUCED EJECTION FRACTION): Primary | ICD-10-CM

## 2022-08-31 DIAGNOSIS — I10 ESSENTIAL HYPERTENSION: ICD-10-CM

## 2022-08-31 PROCEDURE — 99214 OFFICE O/P EST MOD 30 MIN: CPT | Performed by: INTERNAL MEDICINE

## 2022-08-31 NOTE — PROGRESS NOTES
Chief Complaint  Hypertension, CHB (complete heart block) ([facility]), and Congestive Heart Failure    Subjective    Patient following up today with his daughter is been doing well not had any worsening shortness of breath no chest pain problems    Past Medical History:   Diagnosis Date   • Arthritis    • Back injury    • Bronchitis    • Cataract    • CHF (congestive heart failure) ([facility])    • Circulatory disorder    • Colon polyp    • Enlarged prostate    • Genitourinary disorder     UTI SEVERAL YRS AGO INCONTINENCE   • Glaucoma    • Hemorrhoids    • Hypertension     ON MEDS   • Musculoskeletal disorder    • Myocardial infarction (HCC)    • Neurologic disorder     SHRAPNAL IN [place]   • Pneumonia    • Seizures (HCC)     WITH HEAD INJURY IN [place]   • Skin problem     BLISTERING FROM AGENT ORANGE AFTER [place]   • Syncopal episodes          Current Outpatient Medications:   •  Acetaminophen Extra Strength 500 MG tablet, As Needed., Disp: , Rfl:   •  albuterol sulfate  (90 Base) MCG/ACT inhaler, As Needed., Disp: , Rfl:   •  artificial tears (PURALUBE) 85-15 % ointment ophthalmic ointment, Puralube 85-15 % ophthalmic (eye) ointment apply a small amount to affected eye  once a day (at bedtime)   Active, Disp: , Rfl:   •  bicalutamide (CASODEX) 50 MG chemo tablet, , Disp: , Rfl:   •  bimatoprost (LUMIGAN) 0.03 % ophthalmic drops, 1 drop., Disp: , Rfl:   •  Calcium Carb-Cholecalciferol (OYSCO 500 + D) 500-200 MG-UNIT tablet, Oysco 500/D 500 mg(1,250mg) -200 unit oral tablet take 1 tablet by oral route 2 times a day   Active, Disp: , Rfl:   •  carboxymethylcellulose (REFRESH PLUS) 0.5 % solution, 1 drop., Disp: , Rfl:   •  carvedilol (COREG) 3.125 MG tablet, Take 3.125 mg by mouth 2 (Two) Times a Day., Disp: , Rfl:   •  chlorhexidine (PERIDEX) 0.12 % solution, , Disp: , Rfl:   •  cholecalciferol (VITAMIN D3) 25 MCG (1000 UT) tablet, , Disp: , Rfl:   •  diclofenac (VOLTAREN) 75 MG EC tablet, 2 (Two) Times a Day., Disp: ,  "Rfl:   •  Dorzolamide HCl-Timolol Mal PF (Cosopt PF) 22.3-6.8 MG/ML ophthalmic solution, 1 drop., Disp: , Rfl:   •  dorzolamide-timolol (COSOPT) 22.3-6.8 MG/ML ophthalmic solution, , Disp: , Rfl:   •  fluticasone (FLONASE) 50 MCG/ACT nasal spray, fluticasone 50 mcg/actuation nasal spray,suspension spray 2 sprays (100 mcg) in each nostril by intranasal route once daily   Active, Disp: , Rfl:   •  furosemide (LASIX) 80 MG tablet, , Disp: , Rfl:   •  leuprolide (Eligard) 45 MG injection, 45 mg., Disp: , Rfl:   •  loratadine (CLARITIN) 10 MG tablet, Daily., Disp: , Rfl:   •  megestrol (MEGACE) 20 MG tablet, , Disp: , Rfl:   •  Nutritional Supplements (ENSURE COMPLETE PO), Take  by mouth 2 (Two) Times a Day As Needed., Disp: , Rfl:   •  potassium chloride (MICRO-K) 10 MEQ CR capsule, Take 10 mEq by mouth Daily., Disp: , Rfl:   •  sacubitril-valsartan (Entresto) 24-26 MG tablet, Take 1 tablet by mouth 2 (Two) Times a Day., Disp: 180 tablet, Rfl: 0  •  Stimulant Laxative 8.6-50 MG per tablet, , Disp: , Rfl:     There are no discontinued medications.  No Known Allergies     Social History     Tobacco Use   • Smoking status: Never Smoker   • Smokeless tobacco: Never Used   Vaping Use   • Vaping Use: Never used   Substance Use Topics   • Alcohol use: Never   • Drug use: Never       Family History   Problem Relation Age of Onset   • Asthma Mother    • Prostate cancer Father         Objective     /74   Pulse 71   Ht 175.3 cm (69\")   Wt 57.7 kg (127 lb 3.2 oz)   BMI 18.78 kg/m²       Physical Exam    General Appearance:   · no acute distress  · Alert and oriented x3  HENT:   · lips not cyanotic  · Atraumatic  Neck:  · No jvd   · supple  Respiratory:  · no respiratory distress  · normal breath sounds  · no rales  Cardiovascular:  · Regular rate and rhythm  · no S3, no S4   · no murmur  · no rub  Extremities  · No cyanosis  · lower extremity edema: none    Skin:   · warm, dry  · No rashes      Result Review :     No " results found for: PROBNP                              No results found for: TSH   No results found for: FREET4   No results found for: DDIMERQUANT  Magnesium   Date Value Ref Range Status   02/04/2019 2.06 1.60 - 2.30 mg/dL Final      No results found for: DIGOXIN   Lab Results   Component Value Date    TROPONINT 0.01 01/12/2021             Lab Results   Component Value Date    POCTROP 0.10 (H) 04/09/2019                      Diagnoses and all orders for this visit:    1. Chronic HFrEF (heart failure with reduced ejection fraction) ([facility]) (Primary)  Assessment & Plan:  Patient with stable volume and symptoms continue with Lasix 80 mg once a day.      2. Essential hypertension  Assessment & Plan:  Blood pressure controlled continue on Entresto 24-26 twice daily and Coreg 3.125 twice daily dosing      3. ICD (implantable cardioverter-defibrillator) in place  Assessment & Plan:  Device working properly on remote interrogations schedule an office check in 6 months            Follow Up     Return in about 6 months (around 2/28/2023) for Pacemaker w/f/u, Follow with Joyce Lopez.          Patient was given instructions and counseling regarding his condition or for health maintenance advice. Please see specific information pulled into the AVS if appropriate.

## 2022-08-31 NOTE — ASSESSMENT & PLAN NOTE
Blood pressure controlled continue on Entresto 24-26 twice daily and Coreg 3.125 twice daily dosing

## 2022-10-13 RX ORDER — SACUBITRIL AND VALSARTAN 24; 26 MG/1; MG/1
1 TABLET, FILM COATED ORAL 2 TIMES DAILY
Qty: 180 TABLET | Refills: 1 | Status: SHIPPED | OUTPATIENT
Start: 2022-10-13 | End: 2023-02-16 | Stop reason: SDUPTHER

## 2023-01-01 ENCOUNTER — HOSPITAL ENCOUNTER (EMERGENCY)
Facility: HOSPITAL | Age: 88
Discharge: HOME | End: 2023-03-27
Attending: EMERGENCY MEDICINE | Admitting: EMERGENCY MEDICINE
Payer: OTHER GOVERNMENT

## 2023-01-01 ENCOUNTER — APPOINTMENT (OUTPATIENT)
Dept: CT IMAGING | Facility: HOSPITAL | Age: 88
End: 2023-01-01
Payer: OTHER GOVERNMENT

## 2023-01-01 ENCOUNTER — APPOINTMENT (OUTPATIENT)
Dept: GENERAL RADIOLOGY | Facility: HOSPITAL | Age: 88
End: 2023-01-01
Payer: OTHER GOVERNMENT

## 2023-01-01 ENCOUNTER — HOSPITAL ENCOUNTER (EMERGENCY)
Facility: HOSPITAL | Age: 88
Discharge: ANOTHER HOSPITAL | End: 2023-04-03
Attending: EMERGENCY MEDICINE | Admitting: EMERGENCY MEDICINE
Payer: OTHER GOVERNMENT

## 2023-01-01 ENCOUNTER — HOSPITAL ENCOUNTER (OUTPATIENT)
Facility: HOSPITAL | Age: 88
Discharge: HOME | End: 2023-04-01
Attending: EMERGENCY MEDICINE | Admitting: NURSE PRACTITIONER
Payer: MEDICARE

## 2023-01-01 ENCOUNTER — TELEPHONE (OUTPATIENT)
Dept: GASTROENTEROLOGY | Facility: CLINIC | Age: 88
End: 2023-01-01
Payer: MEDICARE

## 2023-01-01 VITALS
HEART RATE: 83 BPM | WEIGHT: 128.31 LBS | RESPIRATION RATE: 18 BRPM | SYSTOLIC BLOOD PRESSURE: 137 MMHG | HEIGHT: 69 IN | BODY MASS INDEX: 19 KG/M2 | DIASTOLIC BLOOD PRESSURE: 91 MMHG | TEMPERATURE: 98.2 F | OXYGEN SATURATION: 97 %

## 2023-01-01 VITALS
TEMPERATURE: 97.4 F | DIASTOLIC BLOOD PRESSURE: 89 MMHG | RESPIRATION RATE: 18 BRPM | OXYGEN SATURATION: 92 % | BODY MASS INDEX: 17.52 KG/M2 | SYSTOLIC BLOOD PRESSURE: 119 MMHG | HEIGHT: 70 IN | WEIGHT: 122.36 LBS | HEART RATE: 53 BPM

## 2023-01-01 VITALS
BODY MASS INDEX: 18.07 KG/M2 | HEART RATE: 111 BPM | HEIGHT: 69 IN | RESPIRATION RATE: 16 BRPM | DIASTOLIC BLOOD PRESSURE: 91 MMHG | TEMPERATURE: 97.7 F | SYSTOLIC BLOOD PRESSURE: 108 MMHG | OXYGEN SATURATION: 92 %

## 2023-01-01 DIAGNOSIS — R11.2 NAUSEA AND VOMITING, UNSPECIFIED VOMITING TYPE: ICD-10-CM

## 2023-01-01 DIAGNOSIS — K31.89 VOLVULUS OF STOMACH: Primary | ICD-10-CM

## 2023-01-01 DIAGNOSIS — R06.00 DYSPNEA, UNSPECIFIED TYPE: ICD-10-CM

## 2023-01-01 DIAGNOSIS — K22.2 ESOPHAGEAL STRICTURE: Primary | ICD-10-CM

## 2023-01-01 DIAGNOSIS — R13.10 DYSPHAGIA, UNSPECIFIED TYPE: Primary | ICD-10-CM

## 2023-01-01 LAB
ALBUMIN SERPL-MCNC: 3.4 G/DL (ref 3.5–5.2)
ALBUMIN SERPL-MCNC: 4 G/DL (ref 3.5–5.2)
ALBUMIN/GLOB SERPL: 1.1 G/DL
ALBUMIN/GLOB SERPL: 1.2 G/DL
ALP SERPL-CCNC: 57 U/L (ref 39–117)
ALP SERPL-CCNC: 65 U/L (ref 39–117)
ALT SERPL W P-5'-P-CCNC: 11 U/L (ref 1–41)
ALT SERPL W P-5'-P-CCNC: 12 U/L (ref 1–41)
ANION GAP SERPL CALCULATED.3IONS-SCNC: 11.5 MMOL/L (ref 5–15)
ANION GAP SERPL CALCULATED.3IONS-SCNC: 12.2 MMOL/L (ref 5–15)
AST SERPL-CCNC: 18 U/L (ref 1–40)
AST SERPL-CCNC: 28 U/L (ref 1–40)
BASOPHILS # BLD AUTO: 0.01 10*3/MM3 (ref 0–0.2)
BASOPHILS # BLD AUTO: 0.01 10*3/MM3 (ref 0–0.2)
BASOPHILS NFR BLD AUTO: 0.1 % (ref 0–1.5)
BASOPHILS NFR BLD AUTO: 0.2 % (ref 0–1.5)
BILIRUB SERPL-MCNC: 0.6 MG/DL (ref 0–1.2)
BILIRUB SERPL-MCNC: 0.9 MG/DL (ref 0–1.2)
BUN SERPL-MCNC: 10 MG/DL (ref 8–23)
BUN SERPL-MCNC: 36 MG/DL (ref 8–23)
BUN/CREAT SERPL: 16.7 (ref 7–25)
BUN/CREAT SERPL: 35.6 (ref 7–25)
CALCIUM SPEC-SCNC: 9.2 MG/DL (ref 8.2–9.6)
CALCIUM SPEC-SCNC: 9.6 MG/DL (ref 8.2–9.6)
CHLORIDE SERPL-SCNC: 101 MMOL/L (ref 98–107)
CHLORIDE SERPL-SCNC: 99 MMOL/L (ref 98–107)
CO2 SERPL-SCNC: 24.5 MMOL/L (ref 22–29)
CO2 SERPL-SCNC: 32.8 MMOL/L (ref 22–29)
CREAT SERPL-MCNC: 0.6 MG/DL (ref 0.76–1.27)
CREAT SERPL-MCNC: 1.01 MG/DL (ref 0.76–1.27)
CYTO UR: NORMAL
DEPRECATED RDW RBC AUTO: 42.9 FL (ref 37–54)
DEPRECATED RDW RBC AUTO: 43.2 FL (ref 37–54)
EGFRCR SERPLBLD CKD-EPI 2021: 70.6 ML/MIN/1.73
EGFRCR SERPLBLD CKD-EPI 2021: 91.7 ML/MIN/1.73
EOSINOPHIL # BLD AUTO: 0 10*3/MM3 (ref 0–0.4)
EOSINOPHIL # BLD AUTO: 0.01 10*3/MM3 (ref 0–0.4)
EOSINOPHIL NFR BLD AUTO: 0 % (ref 0.3–6.2)
EOSINOPHIL NFR BLD AUTO: 0.2 % (ref 0.3–6.2)
ERYTHROCYTE [DISTWIDTH] IN BLOOD BY AUTOMATED COUNT: 12.4 % (ref 12.3–15.4)
ERYTHROCYTE [DISTWIDTH] IN BLOOD BY AUTOMATED COUNT: 12.6 % (ref 12.3–15.4)
GLOBULIN UR ELPH-MCNC: 3.1 GM/DL
GLOBULIN UR ELPH-MCNC: 3.3 GM/DL
GLUCOSE BLDC GLUCOMTR-MCNC: 118 MG/DL (ref 70–99)
GLUCOSE SERPL-MCNC: 102 MG/DL (ref 65–99)
GLUCOSE SERPL-MCNC: 104 MG/DL (ref 65–99)
HCT VFR BLD AUTO: 41.3 % (ref 37.5–51)
HCT VFR BLD AUTO: 43.7 % (ref 37.5–51)
HGB BLD-MCNC: 13.8 G/DL (ref 13–17.7)
HGB BLD-MCNC: 14.4 G/DL (ref 13–17.7)
HOLD SPECIMEN: NORMAL
IMM GRANULOCYTES # BLD AUTO: 0.01 10*3/MM3 (ref 0–0.05)
IMM GRANULOCYTES # BLD AUTO: 0.05 10*3/MM3 (ref 0–0.05)
IMM GRANULOCYTES NFR BLD AUTO: 0.2 % (ref 0–0.5)
IMM GRANULOCYTES NFR BLD AUTO: 0.4 % (ref 0–0.5)
LAB AP CASE REPORT: NORMAL
LAB AP CLINICAL INFORMATION: NORMAL
LAB AP SPECIAL STAINS: NORMAL
LYMPHOCYTES # BLD AUTO: 0.61 10*3/MM3 (ref 0.7–3.1)
LYMPHOCYTES # BLD AUTO: 0.83 10*3/MM3 (ref 0.7–3.1)
LYMPHOCYTES NFR BLD AUTO: 13.1 % (ref 19.6–45.3)
LYMPHOCYTES NFR BLD AUTO: 5.3 % (ref 19.6–45.3)
MAGNESIUM SERPL-MCNC: 2.3 MG/DL (ref 1.6–2.4)
MCH RBC QN AUTO: 30.9 PG (ref 26.6–33)
MCH RBC QN AUTO: 31 PG (ref 26.6–33)
MCHC RBC AUTO-ENTMCNC: 33 G/DL (ref 31.5–35.7)
MCHC RBC AUTO-ENTMCNC: 33.4 G/DL (ref 31.5–35.7)
MCV RBC AUTO: 92.8 FL (ref 79–97)
MCV RBC AUTO: 93.8 FL (ref 79–97)
MONOCYTES # BLD AUTO: 0.37 10*3/MM3 (ref 0.1–0.9)
MONOCYTES # BLD AUTO: 0.59 10*3/MM3 (ref 0.1–0.9)
MONOCYTES NFR BLD AUTO: 5.1 % (ref 5–12)
MONOCYTES NFR BLD AUTO: 5.8 % (ref 5–12)
NEUTROPHILS NFR BLD AUTO: 10.24 10*3/MM3 (ref 1.7–7)
NEUTROPHILS NFR BLD AUTO: 5.12 10*3/MM3 (ref 1.7–7)
NEUTROPHILS NFR BLD AUTO: 80.5 % (ref 42.7–76)
NEUTROPHILS NFR BLD AUTO: 89.1 % (ref 42.7–76)
NRBC BLD AUTO-RTO: 0 /100 WBC (ref 0–0.2)
NRBC BLD AUTO-RTO: 0 /100 WBC (ref 0–0.2)
NT-PROBNP SERPL-MCNC: 8859 PG/ML (ref 0–1800)
PATH REPORT.FINAL DX SPEC: NORMAL
PATH REPORT.GROSS SPEC: NORMAL
PLATELET # BLD AUTO: 118 10*3/MM3 (ref 140–450)
PLATELET # BLD AUTO: 159 10*3/MM3 (ref 140–450)
PMV BLD AUTO: 10.2 FL (ref 6–12)
PMV BLD AUTO: 10.7 FL (ref 6–12)
POTASSIUM SERPL-SCNC: 3.5 MMOL/L (ref 3.5–5.2)
POTASSIUM SERPL-SCNC: 4.6 MMOL/L (ref 3.5–5.2)
PROT SERPL-MCNC: 6.5 G/DL (ref 6–8.5)
PROT SERPL-MCNC: 7.3 G/DL (ref 6–8.5)
QT INTERVAL: 453 MS
QT INTERVAL: 481 MS
RBC # BLD AUTO: 4.45 10*6/MM3 (ref 4.14–5.8)
RBC # BLD AUTO: 4.66 10*6/MM3 (ref 4.14–5.8)
SODIUM SERPL-SCNC: 137 MMOL/L (ref 136–145)
SODIUM SERPL-SCNC: 144 MMOL/L (ref 136–145)
TROPONIN T SERPL HS-MCNC: 73 NG/L
WBC NRBC COR # BLD: 11.5 10*3/MM3 (ref 3.4–10.8)
WBC NRBC COR # BLD: 6.35 10*3/MM3 (ref 3.4–10.8)
WHOLE BLOOD HOLD COAG: NORMAL
WHOLE BLOOD HOLD COAG: NORMAL
WHOLE BLOOD HOLD SPECIMEN: NORMAL
WHOLE BLOOD HOLD SPECIMEN: NORMAL

## 2023-01-01 PROCEDURE — 88342 IMHCHEM/IMCYTCHM 1ST ANTB: CPT | Performed by: INTERNAL MEDICINE

## 2023-01-01 PROCEDURE — 43239 EGD BIOPSY SINGLE/MULTIPLE: CPT | Performed by: INTERNAL MEDICINE

## 2023-01-01 PROCEDURE — 71045 X-RAY EXAM CHEST 1 VIEW: CPT

## 2023-01-01 PROCEDURE — 25010000002 MORPHINE PER 10 MG: Performed by: EMERGENCY MEDICINE

## 2023-01-01 PROCEDURE — 99285 EMERGENCY DEPT VISIT HI MDM: CPT

## 2023-01-01 PROCEDURE — 43249 ESOPH EGD DILATION <30 MM: CPT | Performed by: INTERNAL MEDICINE

## 2023-01-01 PROCEDURE — 25510000001 IOPAMIDOL PER 1 ML: Performed by: EMERGENCY MEDICINE

## 2023-01-01 PROCEDURE — 71046 X-RAY EXAM CHEST 2 VIEWS: CPT

## 2023-01-01 PROCEDURE — 93005 ELECTROCARDIOGRAM TRACING: CPT | Performed by: EMERGENCY MEDICINE

## 2023-01-01 PROCEDURE — 83735 ASSAY OF MAGNESIUM: CPT | Performed by: EMERGENCY MEDICINE

## 2023-01-01 PROCEDURE — 99284 EMERGENCY DEPT VISIT MOD MDM: CPT

## 2023-01-01 PROCEDURE — 25010000002 PIPERACILLIN SOD-TAZOBACTAM PER 1 G: Performed by: EMERGENCY MEDICINE

## 2023-01-01 PROCEDURE — 99283 EMERGENCY DEPT VISIT LOW MDM: CPT

## 2023-01-01 PROCEDURE — 96375 TX/PRO/DX INJ NEW DRUG ADDON: CPT

## 2023-01-01 PROCEDURE — 74177 CT ABD & PELVIS W/CONTRAST: CPT

## 2023-01-01 PROCEDURE — 82962 GLUCOSE BLOOD TEST: CPT

## 2023-01-01 PROCEDURE — 85025 COMPLETE CBC W/AUTO DIFF WBC: CPT | Performed by: NURSE PRACTITIONER

## 2023-01-01 PROCEDURE — C1726 CATH, BAL DIL, NON-VASCULAR: HCPCS | Performed by: INTERNAL MEDICINE

## 2023-01-01 PROCEDURE — 80053 COMPREHEN METABOLIC PANEL: CPT | Performed by: NURSE PRACTITIONER

## 2023-01-01 PROCEDURE — 85025 COMPLETE CBC W/AUTO DIFF WBC: CPT | Performed by: EMERGENCY MEDICINE

## 2023-01-01 PROCEDURE — 36415 COLL VENOUS BLD VENIPUNCTURE: CPT

## 2023-01-01 PROCEDURE — 88312 SPECIAL STAINS GROUP 1: CPT | Performed by: INTERNAL MEDICINE

## 2023-01-01 PROCEDURE — 93010 ELECTROCARDIOGRAM REPORT: CPT | Performed by: INTERNAL MEDICINE

## 2023-01-01 PROCEDURE — 25010000002 GLUCAGON (RDNA) PER 1 MG: Performed by: EMERGENCY MEDICINE

## 2023-01-01 PROCEDURE — 83880 ASSAY OF NATRIURETIC PEPTIDE: CPT | Performed by: NURSE PRACTITIONER

## 2023-01-01 PROCEDURE — 88305 TISSUE EXAM BY PATHOLOGIST: CPT | Performed by: INTERNAL MEDICINE

## 2023-01-01 PROCEDURE — 80053 COMPREHEN METABOLIC PANEL: CPT | Performed by: EMERGENCY MEDICINE

## 2023-01-01 PROCEDURE — 84484 ASSAY OF TROPONIN QUANT: CPT | Performed by: EMERGENCY MEDICINE

## 2023-01-01 PROCEDURE — 96365 THER/PROPH/DIAG IV INF INIT: CPT

## 2023-01-01 PROCEDURE — 93010 ELECTROCARDIOGRAM REPORT: CPT | Performed by: SPECIALIST

## 2023-01-01 PROCEDURE — 96374 THER/PROPH/DIAG INJ IV PUSH: CPT

## 2023-01-01 PROCEDURE — 25010000002 ONDANSETRON PER 1 MG: Performed by: EMERGENCY MEDICINE

## 2023-01-01 RX ORDER — ONDANSETRON 2 MG/ML
4 INJECTION INTRAMUSCULAR; INTRAVENOUS ONCE
Status: COMPLETED | OUTPATIENT
Start: 2023-01-01 | End: 2023-01-01

## 2023-01-01 RX ORDER — FLUCONAZOLE 100 MG/1
100 TABLET ORAL DAILY
Qty: 14 TABLET | Refills: 0 | Status: SHIPPED | OUTPATIENT
Start: 2023-01-01 | End: 2023-04-14

## 2023-01-01 RX ORDER — ONDANSETRON 2 MG/ML
4 INJECTION INTRAMUSCULAR; INTRAVENOUS ONCE AS NEEDED
Status: DISCONTINUED | OUTPATIENT
Start: 2023-01-01 | End: 2023-01-01 | Stop reason: HOSPADM

## 2023-01-01 RX ORDER — IBUPROFEN 600 MG/1
2 TABLET ORAL ONCE
Status: COMPLETED | OUTPATIENT
Start: 2023-01-01 | End: 2023-01-01

## 2023-01-01 RX ORDER — MORPHINE SULFATE 2 MG/ML
2 INJECTION, SOLUTION INTRAMUSCULAR; INTRAVENOUS ONCE
Status: COMPLETED | OUTPATIENT
Start: 2023-01-01 | End: 2023-01-01

## 2023-01-01 RX ORDER — SODIUM CHLORIDE 0.9 % (FLUSH) 0.9 %
10 SYRINGE (ML) INJECTION AS NEEDED
Status: DISCONTINUED | OUTPATIENT
Start: 2023-01-01 | End: 2023-01-01 | Stop reason: HOSPADM

## 2023-01-01 RX ADMIN — IOPAMIDOL 100 ML: 755 INJECTION, SOLUTION INTRAVENOUS at 11:51

## 2023-01-01 RX ADMIN — MORPHINE SULFATE 2 MG: 2 INJECTION, SOLUTION INTRAMUSCULAR; INTRAVENOUS at 23:11

## 2023-01-01 RX ADMIN — ONDANSETRON 4 MG: 2 INJECTION INTRAMUSCULAR; INTRAVENOUS at 10:45

## 2023-01-01 RX ADMIN — GLUCAGON 2 MG: KIT at 19:04

## 2023-01-01 RX ADMIN — TOPICAL ANESTHETIC 1 SPRAY: 200 SPRAY DENTAL; PERIODONTAL at 23:14

## 2023-01-01 RX ADMIN — PIPERACILLIN AND TAZOBACTAM 4.5 G: 4; .5 INJECTION, POWDER, FOR SOLUTION INTRAVENOUS at 20:04

## 2023-01-01 RX ADMIN — SODIUM CHLORIDE 1000 ML: 9 INJECTION, SOLUTION INTRAVENOUS at 10:45

## 2023-02-16 DIAGNOSIS — I50.22 CHRONIC HFREF (HEART FAILURE WITH REDUCED EJECTION FRACTION): Primary | ICD-10-CM

## 2023-02-16 RX ORDER — SACUBITRIL AND VALSARTAN 24; 26 MG/1; MG/1
1 TABLET, FILM COATED ORAL 2 TIMES DAILY
Qty: 180 TABLET | Refills: 0 | Status: SHIPPED | OUTPATIENT
Start: 2023-02-16

## 2023-03-01 ENCOUNTER — OFFICE VISIT (OUTPATIENT)
Dept: CARDIOLOGY | Facility: CLINIC | Age: 88
End: 2023-03-01
Payer: MEDICARE

## 2023-03-01 ENCOUNTER — CLINICAL SUPPORT NO REQUIREMENTS (OUTPATIENT)
Dept: CARDIOLOGY | Facility: CLINIC | Age: 88
End: 2023-03-01
Payer: MEDICARE

## 2023-03-01 VITALS
HEART RATE: 68 BPM | DIASTOLIC BLOOD PRESSURE: 80 MMHG | BODY MASS INDEX: 19.11 KG/M2 | WEIGHT: 129 LBS | HEIGHT: 69 IN | SYSTOLIC BLOOD PRESSURE: 124 MMHG

## 2023-03-01 DIAGNOSIS — I44.2 CHB (COMPLETE HEART BLOCK): ICD-10-CM

## 2023-03-01 DIAGNOSIS — I50.22 CHRONIC HFREF (HEART FAILURE WITH REDUCED EJECTION FRACTION): Primary | ICD-10-CM

## 2023-03-01 DIAGNOSIS — Z95.810 ICD (IMPLANTABLE CARDIOVERTER-DEFIBRILLATOR) IN PLACE: Primary | ICD-10-CM

## 2023-03-01 DIAGNOSIS — Z95.810 ICD (IMPLANTABLE CARDIOVERTER-DEFIBRILLATOR) IN PLACE: ICD-10-CM

## 2023-03-01 DIAGNOSIS — I10 ESSENTIAL HYPERTENSION: ICD-10-CM

## 2023-03-01 PROBLEM — H40.9 GLAUCOMA: Status: ACTIVE | Noted: 2023-03-01

## 2023-03-01 PROBLEM — H26.9 CATARACT: Status: ACTIVE | Noted: 2023-03-01

## 2023-03-01 PROBLEM — M19.90 ARTHRITIS: Status: ACTIVE | Noted: 2023-03-01

## 2023-03-01 PROBLEM — C61 CANCER OF PROSTATE: Status: ACTIVE | Noted: 2023-03-01

## 2023-03-01 PROCEDURE — 99214 OFFICE O/P EST MOD 30 MIN: CPT | Performed by: NURSE PRACTITIONER

## 2023-03-01 PROCEDURE — 93283 PRGRMG EVAL IMPLANTABLE DFB: CPT | Performed by: INTERNAL MEDICINE

## 2023-03-01 NOTE — PROGRESS NOTES
Normal Dual Chamber ICD Device Interrogation and Device Testing.  Normal evaluation of device function and lead measurements.  No optimization was needed of parameters or maximization of device longevity.  Patient is on automated Home Remote Monitoring.

## 2023-03-01 NOTE — PROGRESS NOTES
Chief Complaint  Follow-up and Hypertension    Subjective            History of Present Illness  Ramez Pastor is a 89-year-old male patient who presents to the office today for follow-up.  He has chronic HFrEF with ICD in place and hypertension.  He reports compliance with his medications. He reports some exertional shortness of breath that is mild in nature and only occurs occasionally. He denies any chest pain, lightheadedness/dizziness, palpitations, or edema.    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   • Pneumonia    • Seizures (HCC)     WITH HEAD INJURY IN [place]   • Skin problem     BLISTERING FROM AGENT ORANGE AFTER [place]   • Syncopal episodes          ALLERGY  No Known Allergies       SURGICALHX  Past Surgical History:   Procedure Laterality Date   • ABDOMINAL SURGERY      INTESTINES TWISTED  9-17   • CARDIAC CATHETERIZATION  1970's    ARRYTHMIA   • CARDIAC DEFIBRILLATOR PLACEMENT     • CATARACT EXTRACTION Right     R EYE CATARACT SURGERY  HEAD INJURY   • CHOLECYSTECTOMY     • HEMORRHOIDECTOMY     • LEG SURGERY      HAD IN [place] TO REPAIR LEG INJURY   • MOUTH SURGERY      TOOTH EXTRACTION AND JAW BONE   • TURP / TRANSURETHRAL INCISION / DRAINAGE PROSTATE            SOC  Social History     Socioeconomic History   • Marital status:    Tobacco Use   • Smoking status: Never   • Smokeless tobacco: Never   Vaping Use   • Vaping Use: Never used   Substance and Sexual Activity   • Alcohol use: Never   • Drug use: Never   •  Sexual activity: Defer         FAMHX  Family History   Problem Relation Age of Onset   • Asthma Mother    • Prostate cancer Father           MEDSIGONLY  Current Outpatient Medications on File Prior to Visit   Medication Sig   • Acetaminophen Extra Strength 500 MG tablet As Needed.   • albuterol sulfate  (90 Base) MCG/ACT inhaler As Needed.   • artificial tears (PURALUBE) 85-15 % ointment ophthalmic ointment Puralube 85-15 % ophthalmic (eye) ointment apply a small amount to affected eye  once a day (at bedtime)   Active   • bicalutamide (CASODEX) 50 MG chemo tablet    • Calcium Carb-Cholecalciferol (OYSCO 500 + D) 500-200 MG-UNIT tablet Oysco 500/D 500 mg(1,250mg) -200 unit oral tablet take 1 tablet by oral route 2 times a day   Active   • carboxymethylcellulose (REFRESH PLUS) 0.5 % solution 1 drop.   • carvedilol (COREG) 3.125 MG tablet Take 1 tablet by mouth 2 (Two) Times a Day.   • chlorhexidine (PERIDEX) 0.12 % solution    • cholecalciferol (VITAMIN D3) 25 MCG (1000 UT) tablet    • Dorzolamide HCl-Timolol Mal PF (Cosopt PF) 22.3-6.8 MG/ML ophthalmic solution 1 drop.   • dorzolamide-timolol (COSOPT) 22.3-6.8 MG/ML ophthalmic solution    • fluticasone (FLONASE) 50 MCG/ACT nasal spray fluticasone 50 mcg/actuation nasal spray,suspension spray 2 sprays (100 mcg) in each nostril by intranasal route once daily   Active   • furosemide (LASIX) 80 MG tablet    • loratadine (CLARITIN) 10 MG tablet Daily.   • megestrol (MEGACE) 20 MG tablet    • Nutritional Supplements (ENSURE COMPLETE PO) Take  by mouth 2 (Two) Times a Day As Needed.   • potassium chloride (MICRO-K) 10 MEQ CR capsule Take 1 capsule by mouth Daily.   • sacubitril-valsartan (Entresto) 24-26 MG tablet Take 1 tablet by mouth 2 (Two) Times a Day.   • Stimulant Laxative 8.6-50 MG per tablet    • leuprolide (Eligard) 45 MG injection 45 mg.   • [DISCONTINUED] bimatoprost (LUMIGAN) 0.03 % ophthalmic drops 1 drop.   • [DISCONTINUED] diclofenac (VOLTAREN) 75 MG  "EC tablet 2 (Two) Times a Day.     No current facility-administered medications on file prior to visit.       Objective   /80   Pulse 68   Ht 175.3 cm (69\")   Wt 58.5 kg (129 lb)   BMI 19.05 kg/m²       Physical Exam  HENT:      Head: Normocephalic.   Neck:      Vascular: No carotid bruit.   Cardiovascular:      Rate and Rhythm: Normal rate and regular rhythm.      Pulses: Normal pulses.      Heart sounds: Normal heart sounds. No murmur heard.  Pulmonary:      Effort: Pulmonary effort is normal.      Breath sounds: Normal breath sounds.   Musculoskeletal:      Cervical back: Neck supple.      Right lower leg: No edema.      Left lower leg: No edema.   Skin:     General: Skin is dry.      Capillary Refill: Capillary refill takes less than 2 seconds.   Neurological:      Mental Status: He is alert and oriented to person, place, and time.   Psychiatric:         Behavior: Behavior normal.       Result Review :   The following data was reviewed by: RICHARD Guerrero on 03/01/2023:  No results found for: PROBNP       No results found for: TSH   No results found for: FREET4   No results found for: DDIMERQUANT  Magnesium   Date Value Ref Range Status   02/04/2019 2.06 1.60 - 2.30 mg/dL Final      No results found for: DIGOXIN   Lab Results   Component Value Date    TROPONINT 0.01 01/12/2021                  Assessment and Plan    Diagnoses and all orders for this visit:    1. Chronic HFrEF (heart failure with reduced ejection fraction) (Primary)  Symptomatically stable at this time and euvolemic on exam today, continue carvedilol 3.125 mg twice daily, Lasix 80 mg daily, and Entresto 24-26 mg twice daily.    2. ICD (implantable cardioverter-defibrillator) in place  His device was interrogated in the office today, shows normal device function and a little over 3 years left on the battery.    3. Essential hypertension  Currently controlled and without adverse effects from medication, continue current medication " regimen.  Low-sodium diet discussed.          Follow Up   Return in about 6 months (around 9/1/2023) for Follow up with Dr Jarrett.    Patient was given instructions and counseling regarding his condition or for health maintenance advice. Please see specific information pulled into the AVS if appropriate.     Ramez Pastor  reports that he has never smoked. He has never used smokeless tobacco.         Joyce Lopez, RICHARD  03/01/23  11:41 EST    Dictated Utilizing Dragon Dictation

## 2023-03-14 ENCOUNTER — APPOINTMENT (OUTPATIENT)
Dept: GENERAL RADIOLOGY | Facility: HOSPITAL | Age: 88
End: 2023-03-14
Payer: MEDICARE

## 2023-03-14 ENCOUNTER — HOSPITAL ENCOUNTER (EMERGENCY)
Facility: HOSPITAL | Age: 88
Discharge: HOME | End: 2023-03-14
Attending: EMERGENCY MEDICINE | Admitting: EMERGENCY MEDICINE
Payer: MEDICARE

## 2023-03-14 VITALS
HEIGHT: 69 IN | TEMPERATURE: 97.9 F | BODY MASS INDEX: 19.05 KG/M2 | SYSTOLIC BLOOD PRESSURE: 133 MMHG | DIASTOLIC BLOOD PRESSURE: 78 MMHG | OXYGEN SATURATION: 95 % | RESPIRATION RATE: 18 BRPM | HEART RATE: 74 BPM

## 2023-03-14 DIAGNOSIS — K56.699 FOOD BOLUS OBSTRUCTION OF INTESTINE: Primary | ICD-10-CM

## 2023-03-14 PROCEDURE — 99282 EMERGENCY DEPT VISIT SF MDM: CPT

## 2023-03-14 PROCEDURE — 70360 X-RAY EXAM OF NECK: CPT

## 2023-03-14 NOTE — ED PROVIDER NOTES
Time: 2:53 PM EDT  Date of encounter:  3/14/2023  Independent Historian/Clinical History and Information was obtained by:   Patient and caregiver   Chief Complaint   Patient presents with   • Swallowed Foreign Body       History is limited by: N/A    History of Present Illness:  Patient is a 89 y.o. year old male who presents to the emergency department for evaluation of swallowed foreign body.  Happened approximately 2 hours ago.  Was eating chicken livers and feels that it is stuck in his throat.  Family member states that they heard a whistling noise.  He has been able to swallow liquids.  Denies difficulty breathing at this time.  Feels food bolus in throat. (Maddie De La Torre PA-C provider in triage 2:54 PM EDT )       Swallowed Foreign Body  Location:  Throat   Quality:  Stuck   Duration:  2 hours  Progression:  Improving  Context:  Chicken liver stuck in throat   Associated symptoms: sore throat (food bolus)    Associated symptoms: no abdominal pain, no chest pain, no congestion, no cough, no diarrhea, no fever, no headaches, no myalgias, no nausea, no rhinorrhea, no shortness of breath and no vomiting        Patient Care Team  Primary Care Provider: Vannesa Hammond NP-C    Past Medical History:     No Known Allergies  Past Medical History:   Diagnosis Date   • Arthritis    • Back injury    • Bronchitis    • Cataract    • CHF (congestive heart failure) (HCC)    • Chronic HFrEF (heart failure with reduced ejection fraction) 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    NICM   • Circulatory disorder    • Colon polyp    • Enlarged prostate    • Genitourinary disorder     UTI SEVERAL YRS AGO INCONTINENCE   • Glaucoma    • Hemorrhoids    • Hypertension     ON MEDS   • Musculoskeletal disorder    • Myocardial infarction (HCC)    • Neurologic disorder     SHRAPNAL IN [place]   • Pneumonia    • Seizures (HCC)     WITH HEAD INJURY IN [place]   • Skin problem     BLISTERING FROM AGENT ORANGE AFTER [place]   • Syncopal episodes      Past Surgical History:    Procedure Laterality Date   • ABDOMINAL SURGERY      INTESTINES TWISTED  9-17   • CARDIAC CATHETERIZATION  1970's    ARRYTHMIA   • CARDIAC DEFIBRILLATOR PLACEMENT     • CATARACT EXTRACTION Right     R EYE CATARACT SURGERY  HEAD INJURY   • CHOLECYSTECTOMY     • HEMORRHOIDECTOMY     • LEG SURGERY      HAD IN [place] TO REPAIR LEG INJURY   • MOUTH SURGERY      TOOTH EXTRACTION AND JAW BONE   • TURP / TRANSURETHRAL INCISION / DRAINAGE PROSTATE       Family History   Problem Relation Age of Onset   • Asthma Mother    • Prostate cancer Father        Home Medications:  Prior to Admission medications    Medication Sig Start Date End Date Taking? Authorizing Provider   Acetaminophen Extra Strength 500 MG tablet As Needed. 12/21/21   Sheila Blank MD   albuterol sulfate  (90 Base) MCG/ACT inhaler As Needed.    Sheila Blank MD   artificial tears (PURALUBE) 85-15 % ointment ophthalmic ointment Puralube 85-15 % ophthalmic (eye) ointment apply a small amount to affected eye  once a day (at bedtime)   Active    Sheila Blank MD   bicalutamide (CASODEX) 50 MG chemo tablet  6/27/22   Sheila Blank MD   Calcium Carb-Cholecalciferol (OYSCO 500 + D) 500-200 MG-UNIT tablet Oysco 500/D 500 mg(1,250mg) -200 unit oral tablet take 1 tablet by oral route 2 times a day   Active    Sheila Blank MD   carboxymethylcellulose (REFRESH PLUS) 0.5 % solution 1 drop.    Sheila Blank MD   carvedilol (COREG) 3.125 MG tablet Take 1 tablet by mouth 2 (Two) Times a Day. 1/6/22   Sheila Blank MD   chlorhexidine (PERIDEX) 0.12 % solution  11/23/21   Sheila Blank MD   cholecalciferol (VITAMIN D3) 25 MCG (1000 UT) tablet  6/28/22   Sheila Blank MD   Dorzolamide HCl-Timolol Mal PF (Cosopt PF) 22.3-6.8 MG/ML ophthalmic solution 1 drop.    Sheila Blank MD   dorzolamide-timolol (COSOPT) 22.3-6.8 MG/ML ophthalmic solution  4/15/22   Sheila Blank MD   fluticasone  (FLONASE) 50 MCG/ACT nasal spray fluticasone 50 mcg/actuation nasal spray,suspension spray 2 sprays (100 mcg) in each nostril by intranasal route once daily   Active    Sheila Blank MD   furosemide (LASIX) 80 MG tablet  4/13/22   Sheila Blank MD   loratadine (CLARITIN) 10 MG tablet Daily. 12/21/21   Sheila Blank MD   megestrol (MEGACE) 20 MG tablet  6/29/22   Sheila Blank MD   Nutritional Supplements (ENSURE COMPLETE PO) Take  by mouth 2 (Two) Times a Day As Needed.    Sheila Blank MD   potassium chloride (MICRO-K) 10 MEQ CR capsule Take 1 capsule by mouth Daily. 4/1/21   Sheila Blank MD   sacubitril-valsartan (Entresto) 24-26 MG tablet Take 1 tablet by mouth 2 (Two) Times a Day. 2/16/23   Joyce Lopez APRN   Stimulant Laxative 8.6-50 MG per tablet  6/28/22   Sheila Blank MD        Social History:   Social History     Tobacco Use   • Smoking status: Never   • Smokeless tobacco: Never   Vaping Use   • Vaping Use: Never used   Substance Use Topics   • Alcohol use: Never   • Drug use: Never         Review of Systems:  Review of Systems   Constitutional: Negative for chills and fever.   HENT: Positive for sore throat (food bolus). Negative for congestion, rhinorrhea and trouble swallowing.    Eyes: Negative for pain and visual disturbance.   Respiratory: Negative for apnea, cough, chest tightness and shortness of breath.    Cardiovascular: Negative for chest pain and palpitations.   Gastrointestinal: Negative for abdominal pain, diarrhea, nausea and vomiting.   Genitourinary: Negative for difficulty urinating and dysuria.   Musculoskeletal: Negative for joint swelling and myalgias.   Skin: Negative for color change.   Neurological: Negative for seizures and headaches.   Psychiatric/Behavioral: Negative.    All other systems reviewed and are negative.       Physical Exam:  /78   Pulse 74   Temp 97.9 °F (36.6 °C) (Oral)   Resp 18   Ht 175.3  "cm (69\")   SpO2 95%   BMI 19.05 kg/m²     Physical Exam  Vitals and nursing note reviewed.   Constitutional:       General: He is not in acute distress.     Appearance: Normal appearance. He is not toxic-appearing.   HENT:      Head: Normocephalic and atraumatic.      Jaw: There is normal jaw occlusion.   Eyes:      General: Lids are normal.      Extraocular Movements: Extraocular movements intact.      Conjunctiva/sclera: Conjunctivae normal.      Pupils: Pupils are equal, round, and reactive to light.   Cardiovascular:      Rate and Rhythm: Normal rate and regular rhythm.      Pulses: Normal pulses.      Heart sounds: Normal heart sounds.   Pulmonary:      Effort: Pulmonary effort is normal. No respiratory distress.      Breath sounds: Normal breath sounds. No stridor. No wheezing or rhonchi.   Abdominal:      General: Abdomen is flat. There is no distension.      Palpations: Abdomen is soft.      Tenderness: There is no abdominal tenderness. There is no guarding or rebound.   Musculoskeletal:         General: Normal range of motion.      Cervical back: Normal range of motion and neck supple.      Right lower leg: No edema.      Left lower leg: No edema.   Skin:     General: Skin is warm and dry.      Coloration: Skin is not cyanotic.   Neurological:      Mental Status: He is alert and oriented to person, place, and time. Mental status is at baseline.   Psychiatric:         Attention and Perception: Attention and perception normal.         Mood and Affect: Mood normal.                  Procedures:  Procedures      Medical Decision Making:      Comorbidities that affect care:    Congestive Heart Failure, Hypertension    External Notes reviewed:    Previous Clinic Note: Patient was recently seen in clinic for evaluation of hypertension.      The following orders were placed and all results were independently analyzed by me:  Orders Placed This Encounter   Procedures   • XR Neck Soft Tissue       Medications Given " in the Emergency Department:  Medications - No data to display     ED Course:    The patient was initially evaluated in the triage area where orders were placed. The patient was later dispositioned by Tigist Rand MD.      The patient was advised to stay for completion of workup which includes but is not limited to communication of labs and radiological results, reassessment and plan. The patient was advised that leaving prior to disposition by a provider could result in critical findings that are not communicated to the patient.          Labs:    Lab Results (last 24 hours)     ** No results found for the last 24 hours. **           Imaging:    XR Neck Soft Tissue    Result Date: 3/14/2023  PROCEDURE: XR NECK SOFT TISSUE  COMPARISON: None  INDICATIONS: food in throat/CHOKING EPISODE ON LIVERWORST  FINDINGS:   The epiglottis has a normal appearance.  Prevertebral soft tissues are within normal limits.  No radiopaque foreign bodies are identified.  No acute osseous abnormalities are identified.  There is a left-sided cardiac pacemaker.  IMPRESSION: Negative exam.   DIMITRY BHATT MD       Electronically Signed and Approved By: DIMITRY BHATT MD on 3/14/2023 at 15:35                 Differential Diagnosis and Discussion:      Sore Throat: Differential diagnosis includes but is not limited to bacterial infection, viral infection, inhaled irritants, sinus drainage, thyroiditis, epiglottitis, and retropharyngeal abscess.    All X-rays were independently reviewed by me.    MDM  Number of Diagnoses or Management Options  Food bolus obstruction of intestine (HCC)  Diagnosis management comments: X-ray of the soft tissue neck is negative for esophageal foreign body.  The patient was able to tolerate p.o. intake in emergency department.  He was monitored for 3 hours and has had no pooling of saliva or emesis.  Patient is stable and suitable for discharge.       Amount and/or Complexity of Data Reviewed  Tests in the  radiology section of CPT®: reviewed  Obtain history from someone other than the patient: yes (Family denies shortness of breath )  Independent visualization of images, tracings, or specimens: yes    Risk of Complications, Morbidity, and/or Mortality  Presenting problems: moderate  Management options: moderate    Patient Progress  Patient progress: resolved (16:38 EDT Updated patient on results and plan. Patient expressed understanding and agreement. All questions answered at this time.   16:39 EDT Updated patient on results and plan for discharge. Patient expressed understanding and agreement. All questions answered at this time.  )           Patient Care Considerations:    CT CHEST: I considered ordering a CT scan of the chest, however Patient was able to swallow and the soft tissue x-ray showed no foreign body.      Consultants/Shared Management Plan:    None    Social Determinants of Health:    Patient has presented with family members who are responsible, reliable and will ensure follow up care.      Disposition and Care Coordination:    Discharged: I considered escalation of care by admitting this patient for observation, however the patient has improved and is suitable and  stable for discharge.    I have explained the patient´s condition, diagnoses and treatment plan based on the information available to me at this time. I have answered questions and addressed any concerns. The patient has a good  understanding of the patient´s diagnosis, condition, and treatment plan as can be expected at this point. The vital signs have been stable. The patient´s condition is stable and appropriate for discharge from the emergency department.      The patient will pursue further outpatient evaluation with the primary care physician or other designated or consulting physician as outlined in the discharge instructions. They are agreeable to this plan of care and follow-up instructions have been explained in detail. The  patient has received these instructions in written format and have expressed an understanding of the discharge instructions. The patient is aware that any significant change in condition or worsening of symptoms should prompt an immediate return to this or the closest emergency department or call to 911.  I have explained discharge medications and the need for follow up with the patient/caretakers. This was also printed in the discharge instructions. Patient was discharged with the following medications and follow up:      Medication List      No changes were made to your prescriptions during this visit.      Vannesa Hammond, RONA-C  [address] [place], [place] [id number] [phone]             Final diagnoses:   Food bolus obstruction of intestine (HCC)        ED Disposition     ED Disposition   Discharge    Condition   Stable    Comment   --             This medical record created using voice recognition software.    Documentation assistance provided by Symone Hare acting as scribe for  Tigist Chiang MD. Information recorded by the scribe was done at my direction and has been verified and validated by me.         Symone Hare  03/14/23 [id number]       Tigist Rand MD  03/14/23 [id number]

## 2023-03-27 NOTE — ED PROVIDER NOTES
Time: 2:37 PM EDT  Date of encounter:  3/27/2023  Independent Historian/Clinical History and Information was obtained by:   Patient, Caregiver  Chief Complaint   Patient presents with   • Shortness of Breath       History is limited by: N/A    History of Present Illness:  Patient is a 89 y.o. year old male who presents to the emergency department for evaluation of SOB.   Patient reports problems swallowing and difficulty breathing since last night after eating a piece of candy.  Female  with him reports that he has been spitting up some clear mucus.  She does not believe that he got choked.  She says that his oxygen was 93% at home on room air. (Shavon Bowen, APRN)    Pt caregiver is with him and giving some history. Pt caregiver is concerned about pt throwing up mucus after having trouble swallowing a piece of candy. She notes coming in to the ED before for this issue.     Pt notes he is not SOB right now because he is at rest. Pt caregiver notes SOB on exertion.     Pt is on Lasix 80 mg.           Patient Care Team  Primary Care Provider: Vannesa Hammond NP-C    Past Medical History:     No Known Allergies  Past Medical History:   Diagnosis Date   • Arthritis    • Back injury    • Bronchitis    • Cataract    • CHF (congestive heart failure) (HCC)    • Chronic HFrEF (heart failure with reduced ejection fraction) 2/20/2022    NICM   • Circulatory disorder    • Colon polyp    • Enlarged prostate    • Genitourinary disorder     UTI SEVERAL YRS AGO INCONTINENCE   • Glaucoma    • Hemorrhoids    • Hypertension     ON MEDS   • Musculoskeletal disorder    • Myocardial infarction (HCC)    • Neurologic disorder     SHRAPNAL IN [place]   • Pneumonia    • Seizures (HCC)     WITH HEAD INJURY IN [place]   • Skin problem     BLISTERING FROM AGENT ORANGE AFTER [place]   • Syncopal episodes      Past Surgical History:   Procedure Laterality Date   • ABDOMINAL SURGERY      INTESTINES TWISTED  9-17   • CARDIAC CATHETERIZATION   1970's    ARRYTHMIA   • CARDIAC DEFIBRILLATOR PLACEMENT     • CATARACT EXTRACTION Right     R EYE CATARACT SURGERY  HEAD INJURY   • CHOLECYSTECTOMY     • HEMORRHOIDECTOMY     • LEG SURGERY      HAD IN [place] TO REPAIR LEG INJURY   • MOUTH SURGERY      TOOTH EXTRACTION AND JAW BONE   • TURP / TRANSURETHRAL INCISION / DRAINAGE PROSTATE       Family History   Problem Relation Age of Onset   • Asthma Mother    • Prostate cancer Father        Home Medications:  Prior to Admission medications    Medication Sig Start Date End Date Taking? Authorizing Provider   Acetaminophen Extra Strength 500 MG tablet As Needed. 12/21/21   Sheila Blank MD   albuterol sulfate  (90 Base) MCG/ACT inhaler As Needed.    Sheila Blank MD   artificial tears (PURALUBE) 85-15 % ointment ophthalmic ointment Puralube 85-15 % ophthalmic (eye) ointment apply a small amount to affected eye  once a day (at bedtime)   Active    Sheila Blank MD   bicalutamide (CASODEX) 50 MG chemo tablet  6/27/22   Sheila Blank MD   Calcium Carb-Cholecalciferol (OYSCO 500 + D) 500-200 MG-UNIT tablet Oysco 500/D 500 mg(1,250mg) -200 unit oral tablet take 1 tablet by oral route 2 times a day   Active    Sheila Blank MD   carboxymethylcellulose (REFRESH PLUS) 0.5 % solution 1 drop.    Sheila Blank MD   carvedilol (COREG) 3.125 MG tablet Take 1 tablet by mouth 2 (Two) Times a Day. 1/6/22   Sheila Blank MD   chlorhexidine (PERIDEX) 0.12 % solution  11/23/21   Sheila Blank MD   cholecalciferol (VITAMIN D3) 25 MCG (1000 UT) tablet  6/28/22   Sheila Blank MD   Dorzolamide HCl-Timolol Mal PF (Cosopt PF) 22.3-6.8 MG/ML ophthalmic solution 1 drop.    Sheila Blank MD   dorzolamide-timolol (COSOPT) 22.3-6.8 MG/ML ophthalmic solution  4/15/22   Sheila Blank MD   fluticasone (FLONASE) 50 MCG/ACT nasal spray fluticasone 50 mcg/actuation nasal spray,suspension spray 2 sprays (100  "mcg) in each nostril by intranasal route once daily   Active    Sheila Blank MD   furosemide (LASIX) 80 MG tablet  4/13/22   Sheila Blank MD   loratadine (CLARITIN) 10 MG tablet Daily. 12/21/21   Sheila Blank MD   megestrol (MEGACE) 20 MG tablet  6/29/22   Sheila Blank MD   Nutritional Supplements (ENSURE COMPLETE PO) Take  by mouth 2 (Two) Times a Day As Needed.    Sheila Blank MD   potassium chloride (MICRO-K) 10 MEQ CR capsule Take 1 capsule by mouth Daily. 4/1/21   Sheila Blank MD   sacubitril-valsartan (Entresto) 24-26 MG tablet Take 1 tablet by mouth 2 (Two) Times a Day. 2/16/23   Joyce Lopez APRN   Stimulant Laxative 8.6-50 MG per tablet  6/28/22   Sheila Blank MD        Social History:   Social History     Tobacco Use   • Smoking status: Never   • Smokeless tobacco: Never   Vaping Use   • Vaping Use: Never used   Substance Use Topics   • Alcohol use: Never   • Drug use: Never         Review of Systems:  Review of Systems   Constitutional: Negative for chills and fever.   HENT: Positive for trouble swallowing. Negative for sore throat.    Eyes: Negative for photophobia.   Respiratory: Positive for shortness of breath.    Cardiovascular: Negative for chest pain.   Gastrointestinal: Negative for abdominal pain, diarrhea, nausea and vomiting.   Genitourinary: Negative for dysuria.   Musculoskeletal: Negative for neck pain.   Skin: Negative for wound.   Neurological: Negative for headaches.   All other systems reviewed and are negative.       Physical Exam:  /91 (Patient Position: Lying)   Pulse 83   Temp 98.2 °F (36.8 °C) (Oral)   Resp 18   Ht 175.3 cm (69.02\")   Wt 58.2 kg (128 lb 4.9 oz)   SpO2 97%   BMI 18.94 kg/m²     Physical Exam  Vitals and nursing note reviewed.   Constitutional:       General: He is not in acute distress.  HENT:      Head: Normocephalic and atraumatic.   Eyes:      Extraocular Movements: Extraocular " movements intact.   Cardiovascular:      Rate and Rhythm: Normal rate and regular rhythm.   Pulmonary:      Effort: Pulmonary effort is normal. No respiratory distress.      Breath sounds: Examination of the right-upper field reveals decreased breath sounds. Examination of the left-upper field reveals decreased breath sounds. Examination of the right-middle field reveals decreased breath sounds. Examination of the left-middle field reveals decreased breath sounds. Examination of the right-lower field reveals decreased breath sounds. Examination of the left-lower field reveals decreased breath sounds. Decreased breath sounds present.      Comments: Diminished breath sounds but clear.   Abdominal:      General: Abdomen is flat.      Palpations: Abdomen is soft.      Tenderness: There is no abdominal tenderness.   Musculoskeletal:         General: Normal range of motion.      Cervical back: Normal range of motion and neck supple.   Skin:     General: Skin is warm and dry.      Capillary Refill: Capillary refill takes less than 2 seconds.   Neurological:      Mental Status: He is alert and oriented to person, place, and time. Mental status is at baseline.                  Procedures:  Procedures      Medical Decision Making:      Comorbidities that affect care:    Congestive Heart Failure, Hypertension    External Notes reviewed:    Previous ED Note: Patient was seen 3/14/2023 in this ER for possible esophageal food bolus.      The following orders were placed and all results were independently analyzed by me:  Orders Placed This Encounter   Procedures   • XR Chest 2 View   • Comprehensive Metabolic Panel   • Onia Draw   • BNP   • CBC Auto Differential   • CBC & Differential   • Green Top (Gel)   • Lavender Top   • Gold Top - SST   • Light Blue Top       Medications Given in the Emergency Department:  Medications - No data to display     ED Course:    The patient was initially evaluated in the triage area where  orders were placed. The patient was later dispositioned by Matt Maier DO.      The patient was advised to stay for completion of workup which includes but is not limited to communication of labs and radiological results, reassessment and plan. The patient was advised that leaving prior to disposition by a provider could result in critical findings that are not communicated to the patient.          Labs:    Lab Results (last 24 hours)     Procedure Component Value Units Date/Time    CBC & Differential [[id number]]  (Abnormal) Collected: 03/27/23 1720    Specimen: Blood Updated: 03/27/23 1735    Narrative:      The following orders were created for panel order CBC & Differential.  Procedure                               Abnormality         Status                     ---------                               -----------         ------                     CBC Auto Differential[[id number]]        Abnormal            Final result                 Please view results for these tests on the individual orders.    Comprehensive Metabolic Panel [[id number]]  (Abnormal) Collected: 03/27/23 1720    Specimen: Blood Updated: 03/27/23 1757     Glucose 102 mg/dL      BUN 10 mg/dL      Creatinine 0.60 mg/dL      Sodium 137 mmol/L      Potassium 4.6 mmol/L      Comment: Specimen hemolyzed.  Results may be affected.        Chloride 101 mmol/L      CO2 24.5 mmol/L      Calcium 9.6 mg/dL      Total Protein 7.3 g/dL      Albumin 4.0 g/dL      ALT (SGPT) 12 U/L      Comment: Specimen hemolyzed.  Results may be affected.        AST (SGOT) 28 U/L      Comment: Specimen hemolyzed.  Results may be affected.        Alkaline Phosphatase 65 U/L      Total Bilirubin 0.6 mg/dL      Globulin 3.3 gm/dL      A/G Ratio 1.2 g/dL      BUN/Creatinine Ratio 16.7     Anion Gap 11.5 mmol/L      eGFR 91.7 mL/min/1.73     Narrative:      GFR Normal >60  Chronic Kidney Disease <60  Kidney Failure <15    The GFR formula is only valid for adults with stable renal  function between ages 18 and 70.    BNP [[id number]]  (Abnormal) Collected: 03/27/23 1720    Specimen: Blood Updated: 03/27/23 1752     proBNP 8,859.0 pg/mL     Narrative:      Among patients with dyspnea, NT-proBNP is highly sensitive for the detection of acute congestive heart failure. In addition NT-proBNP of <300 pg/ml effectively rules out acute congestive heart failure with 99% negative predictive value.      CBC Auto Differential [[id number]]  (Abnormal) Collected: 03/27/23 1720    Specimen: Blood Updated: 03/27/23 1735     WBC 6.35 10*3/mm3      RBC 4.45 10*6/mm3      Hemoglobin 13.8 g/dL      Hematocrit 41.3 %      MCV 92.8 fL      MCH 31.0 pg      MCHC 33.4 g/dL      RDW 12.6 %      RDW-SD 42.9 fl      MPV 10.2 fL      Platelets 159 10*3/mm3      Neutrophil % 80.5 %      Lymphocyte % 13.1 %      Monocyte % 5.8 %      Eosinophil % 0.2 %      Basophil % 0.2 %      Immature Grans % 0.2 %      Neutrophils, Absolute 5.12 10*3/mm3      Lymphocytes, Absolute 0.83 10*3/mm3      Monocytes, Absolute 0.37 10*3/mm3      Eosinophils, Absolute 0.01 10*3/mm3      Basophils, Absolute 0.01 10*3/mm3      Immature Grans, Absolute 0.01 10*3/mm3      nRBC 0.0 /100 WBC            Imaging:    XR Chest 2 View    Result Date: 3/27/2023  PROCEDURE: XR CHEST 2 VW  COMPARISON: [facility], , CHEST AP/PA 1 VIEW, 1/12/2021, 10:35.  INDICATIONS: SOA after eating candy hx of obstruction  FINDINGS:  There is a left subclavian AICD device in place with leads over the right atrium and right ventricle.  The heart is enlarged.  There is tortuosity of the thoracic aorta.  There is chronic elevation of left hemidiaphragm with distention of the large bowel and probable small bowel distention seen in the left upper quadrant.  There are no focal consolidations.  There is mild left basilar atelectasis.       Cardiomegaly.  Marked elevation of left hemidiaphragm with left basilar atelectasis.     TRISTIAN ENRIQUEZ MD       Electronically  Signed and Approved By: TRISTIAN ENRIQUEZ MD on 3/27/2023 at 15:25                 Differential Diagnosis and Discussion:      Dyspnea: Differential diagnosis includes but is not limited to metabolic acidosis, neurological disorders, psychogenic, asthma, pneumothorax, upper airway obstruction, COPD, pneumonia, noncardiogenic pulmonary edema, interstitial lung disease, anemia, congestive heart failure, and pulmonary embolism    All labs were reviewed and interpreted by me.  All X-rays were independently reviewed by me.    MDM   Per caregiver patient had difficulty swallowing last night with a piece of hard candy.  Patient was seen recently for dysphagia in the emergency department.  Patient has previously been seen by Dr. Otero, gastroenterology for EGD related to swallowing difficulties several years ago.  Patient is able to tolerate drinking water without difficulty.  Patient's breathing is not distressed and his pulse ox is 96% on room air.  Chest x-ray is stable and clear.  Patient is stable for discharge with outpatient follow-up with GI.      Patient Care Considerations:          Consultants/Shared Management Plan:    None    Social Determinants of Health:    Patient has presented with caregiver who are responsible, reliable and will ensure follow up care.      Disposition and Care Coordination:    Discharged: The patient is suitable and stable for discharge with no need for consideration of observation or admission.        Final diagnoses:   Dysphagia, unspecified type   Dyspnea, unspecified type        ED Disposition     ED Disposition   Discharge    Condition   Stable    Comment   --             This medical record created using voice recognition software.      Documentation assistance provided by Colt Kilpatrick acting as scribe for Matt Maier DO. Information recorded by the scribe was done at my direction and has been verified and validated by me.        Colt Kilpatrick  03/27/23 1919       Matt Maier  DO  03/27/23 2005

## 2023-03-28 NOTE — DISCHARGE INSTRUCTIONS
Follow-up with Dr. Otero, gastroenterology, who you have seen previously for swallowing difficulty.

## 2023-03-31 ENCOUNTER — ANESTHESIA EVENT (OUTPATIENT)
Dept: PERIOP | Facility: HOSPITAL | Age: 88
End: 2023-03-31
Payer: MEDICARE

## 2023-03-31 ENCOUNTER — ANESTHESIA (OUTPATIENT)
Dept: PERIOP | Facility: HOSPITAL | Age: 88
End: 2023-03-31
Payer: MEDICARE

## 2023-03-31 PROBLEM — K22.2 ESOPHAGEAL STRICTURE: Status: ACTIVE | Noted: 2023-03-31

## 2023-03-31 PROCEDURE — 25010000002 PROPOFOL 10 MG/ML EMULSION: Performed by: NURSE ANESTHETIST, CERTIFIED REGISTERED

## 2023-03-31 RX ORDER — PROPOFOL 10 MG/ML
VIAL (ML) INTRAVENOUS AS NEEDED
Status: DISCONTINUED | OUTPATIENT
Start: 2023-03-31 | End: 2023-03-31 | Stop reason: SURG

## 2023-03-31 RX ORDER — PHENYLEPHRINE HCL IN 0.9% NACL 1 MG/10 ML
SYRINGE (ML) INTRAVENOUS AS NEEDED
Status: DISCONTINUED | OUTPATIENT
Start: 2023-03-31 | End: 2023-03-31 | Stop reason: SURG

## 2023-03-31 RX ORDER — KETAMINE HCL IN NACL, ISO-OSM 100MG/10ML
SYRINGE (ML) INJECTION AS NEEDED
Status: DISCONTINUED | OUTPATIENT
Start: 2023-03-31 | End: 2023-03-31 | Stop reason: SURG

## 2023-03-31 RX ORDER — LIDOCAINE HYDROCHLORIDE 20 MG/ML
INJECTION, SOLUTION EPIDURAL; INFILTRATION; INTRACAUDAL; PERINEURAL AS NEEDED
Status: DISCONTINUED | OUTPATIENT
Start: 2023-03-31 | End: 2023-03-31 | Stop reason: SURG

## 2023-03-31 RX ORDER — SODIUM CHLORIDE, SODIUM LACTATE, POTASSIUM CHLORIDE, CALCIUM CHLORIDE 600; 310; 30; 20 MG/100ML; MG/100ML; MG/100ML; MG/100ML
INJECTION, SOLUTION INTRAVENOUS CONTINUOUS PRN
Status: DISCONTINUED | OUTPATIENT
Start: 2023-03-31 | End: 2023-03-31 | Stop reason: SURG

## 2023-03-31 RX ADMIN — SODIUM CHLORIDE, POTASSIUM CHLORIDE, SODIUM LACTATE AND CALCIUM CHLORIDE: 600; 310; 30; 20 INJECTION, SOLUTION INTRAVENOUS at 20:23

## 2023-03-31 RX ADMIN — Medication 100 MCG: at 20:34

## 2023-03-31 RX ADMIN — Medication 10 MG: at 20:43

## 2023-03-31 RX ADMIN — LIDOCAINE HYDROCHLORIDE 100 MG: 20 INJECTION, SOLUTION EPIDURAL; INFILTRATION; INTRACAUDAL; PERINEURAL at 20:27

## 2023-03-31 RX ADMIN — PROPOFOL 10 MG: 10 INJECTION, EMULSION INTRAVENOUS at 20:27

## 2023-03-31 RX ADMIN — Medication 10 MG: at 20:27

## 2023-03-31 RX ADMIN — Medication 100 MCG: at 20:41

## 2023-03-31 NOTE — ED PROVIDER NOTES
Time: 6:08 PM EDT  Date of encounter:  3/31/2023  Independent Historian/Clinical History and Information was obtained by:   Patient and Family  Chief Complaint   Patient presents with   • Vomiting   • Weakness - Generalized       History is limited by: N/A    History of Present Illness:  Patient is a 89 y.o. year old male who presents to the emergency department for evaluation of possible esophageal foreign body. Patient states was seen on Mar 14th for same but food passed on its own. Patient's family states for the past two days, patient has been unable to eat/drink and keep anything down. Patient states it comes right back out. Able to swallow own saliva. Denies abd pain. Denies diarrhea.     Patient has been vomiting since 3/27 and his caregiver reports that he has been progressively getting worse since then. Patient has been here 3/14 and 3/27 for food being stuck in his esophagus. Patient's family states that for 2 days, the patient has been unable to keep fluids down. PMHx: HTN, CHF, CAD. Patient has a pacemaker. No lung problems.         Patient Care Team  Primary Care Provider: Vannesa Hammond NP-C    Past Medical History:     No Known Allergies  Past Medical History:   Diagnosis Date   • Arthritis    • Back injury    • Bronchitis    • Cataract    • CHF (congestive heart failure) (HCC)    • Chronic HFrEF (heart failure with reduced ejection fraction) 2/20/2022    NICM   • Circulatory disorder    • Colon polyp    • Enlarged prostate    • Genitourinary disorder     UTI SEVERAL YRS AGO INCONTINENCE   • Glaucoma    • Hemorrhoids    • Hypertension     ON MEDS   • Musculoskeletal disorder    • Myocardial infarction (HCC)    • Neurologic disorder     SHRAPNAL IN [place]   • Pneumonia    • Seizures (HCC)     WITH HEAD INJURY IN [place]   • Skin problem     BLISTERING FROM AGENT ORANGE AFTER [place]   • Syncopal episodes      Past Surgical History:   Procedure Laterality Date   • ABDOMINAL SURGERY      INTESTINES  TWISTED  9-17   • CARDIAC CATHETERIZATION  1970's    ARRYTHMIA   • CARDIAC DEFIBRILLATOR PLACEMENT     • CATARACT EXTRACTION Right     R EYE CATARACT SURGERY  HEAD INJURY   • CHOLECYSTECTOMY     • HEMORRHOIDECTOMY     • LEG SURGERY      HAD IN [place] TO REPAIR LEG INJURY   • MOUTH SURGERY      TOOTH EXTRACTION AND JAW BONE   • TURP / TRANSURETHRAL INCISION / DRAINAGE PROSTATE       Family History   Problem Relation Age of Onset   • Asthma Mother    • Prostate cancer Father        Home Medications:  Prior to Admission medications    Medication Sig Start Date End Date Taking? Authorizing Provider   Acetaminophen Extra Strength 500 MG tablet As Needed. 12/21/21   Sheila Blank MD   albuterol sulfate  (90 Base) MCG/ACT inhaler As Needed.    Sheila Blank MD   artificial tears (PURALUBE) 85-15 % ointment ophthalmic ointment Puralube 85-15 % ophthalmic (eye) ointment apply a small amount to affected eye  once a day (at bedtime)   Active    Sheila Blank MD   bicalutamide (CASODEX) 50 MG chemo tablet  6/27/22   Sheila Blank MD   Calcium Carb-Cholecalciferol (OYSCO 500 + D) 500-200 MG-UNIT tablet Oysco 500/D 500 mg(1,250mg) -200 unit oral tablet take 1 tablet by oral route 2 times a day   Active    Sheila Blank MD   carboxymethylcellulose (REFRESH PLUS) 0.5 % solution 1 drop.    Sheila Blank MD   carvedilol (COREG) 3.125 MG tablet Take 1 tablet by mouth 2 (Two) Times a Day. 1/6/22   Sheila Blank MD   chlorhexidine (PERIDEX) 0.12 % solution  11/23/21   Sheila Blank MD   cholecalciferol (VITAMIN D3) 25 MCG (1000 UT) tablet  6/28/22   Sheila Blank MD   Dorzolamide HCl-Timolol Mal PF (Cosopt PF) 22.3-6.8 MG/ML ophthalmic solution 1 drop.    Sheila Blank MD   dorzolamide-timolol (COSOPT) 22.3-6.8 MG/ML ophthalmic solution  4/15/22   Sheila Blank MD   fluticasone (FLONASE) 50 MCG/ACT nasal spray fluticasone 50 mcg/actuation  "nasal spray,suspension spray 2 sprays (100 mcg) in each nostril by intranasal route once daily   Active    Sheila Blank MD   furosemide (LASIX) 80 MG tablet  4/13/22   Sheila Blank MD   loratadine (CLARITIN) 10 MG tablet Daily. 12/21/21   Sheila Blank MD   megestrol (MEGACE) 20 MG tablet  6/29/22   Sheila Blank MD   Nutritional Supplements (ENSURE COMPLETE PO) Take  by mouth 2 (Two) Times a Day As Needed.    Sheila Blank MD   potassium chloride (MICRO-K) 10 MEQ CR capsule Take 1 capsule by mouth Daily. 4/1/21   Sheila Blank MD   sacubitril-valsartan (Entresto) 24-26 MG tablet Take 1 tablet by mouth 2 (Two) Times a Day. 2/16/23   Joyce Lopez APRN   Stimulant Laxative 8.6-50 MG per tablet  6/28/22   Sheila Blank MD        Social History:   Social History     Tobacco Use   • Smoking status: Never   • Smokeless tobacco: Never   Vaping Use   • Vaping Use: Never used   Substance Use Topics   • Alcohol use: Never   • Drug use: Never         Review of Systems:  Review of Systems   Constitutional: Positive for appetite change. Negative for chills and fever.   Respiratory: Negative for choking.    Gastrointestinal: Positive for nausea and vomiting. Negative for abdominal pain.        Physical Exam:  /87   Pulse 83   Temp 97.5 °F (36.4 °C)   Resp 20   Ht 177.8 cm (70\")   Wt 55.5 kg (122 lb 5.7 oz)   SpO2 94%   BMI 17.56 kg/m²     Physical Exam  Constitutional:       Appearance: Normal appearance.   HENT:      Head: Normocephalic.   Eyes:      Extraocular Movements: Extraocular movements intact.      Conjunctiva/sclera: Conjunctivae normal.   Pulmonary:      Effort: Pulmonary effort is normal.   Skin:     General: Skin is warm.      Coloration: Skin is not cyanotic.   Neurological:      Mental Status: He is alert and oriented to person, place, and time.   Psychiatric:         Attention and Perception: Attention and perception normal.         " Mood and Affect: Mood normal.                  Procedures:  Procedures      Medical Decision Making:      Comorbidities that affect care:    Congestive Heart Failure, Coronary Artery Disease, Hypertension    External Notes reviewed:    See ED course      The following orders were placed and all results were independently analyzed by me:  Orders Placed This Encounter   Procedures   • Po challenge please. Let me know if patient is able to swallow and keep fluids down.  Misc Nursing Order (Specify)   • Obtain Informed Consent   • Notify Physician or Go To The ED For the Following Conditions   • Activity as Tolerated   • Discharge Follow-up with Specified Provider: GI; 1 Month   • Gastroenterology (on-call MD unless specified)   • ECG 12 Lead Other; Dysphagia   • Discharge patient   • UPPER GI ENDOSCOPY       Medications Given in the Emergency Department:  Medications   glucagon (GLUCAGEN) injection 2 mg (2 mg Intravenous Given 3/31/23 1904)        ED Course:    The patient was initially evaluated in the triage area where orders were placed. The patient was later dispositioned by Alexx Louis MD.      The patient was advised to stay for completion of workup which includes but is not limited to communication of labs and radiological results, reassessment and plan. The patient was advised that leaving prior to disposition by a provider could result in critical findings that are not communicated to the patient.     ED Course as of 03/31/23 2213   Fri Mar 31, 2023   1756 The patient was seen and examined by me, RICHARD Wyatt, while in triage. Orders placed. Patient is awaiting disposition.   [AR]   1817 Encounter review: Patient seen in our emergency department on 3/14/2023 and again on 3/27/2023.  Both visits for dysphagia/possible food bolus to the esophagus.  Reportedly p.o. tolerant in the emergency department with no signs of esophageal meat impaction. [RP]   1824 Intolerant of fluids on my evaluation.  [RP]   1827 Case discussed with Dr. Heath.  He states he will take patient to endoscopy now. [RP]      ED Course User Index  [AR] Jewell Cheung, RICHARD  [RP] Alexx Louis MD       Labs:    Lab Results (last 24 hours)     ** No results found for the last 24 hours. **           Imaging:    No Radiology Exams Resulted Within Past 24 Hours      Differential Diagnosis and Discussion:      Vomiting: Differential diagnosis includes but is not limited to migraine, labyrinthine disorders, psychogenic, metabolic and endocrine causes, peptic ulcer, gastric outlet obstruction, gastritis, gastroenteritis, appendicitis, intestinal obstruction, paralytic ileus, food poisoning, cholecystitis, acute hepatitis, acute pancreatitis, acute febrile illness, and myocardial infarction.    EKG was interpreted by me.    MDM         Patient Care Considerations:    LABS: I considered ordering labs, however Patient just had lab work done in the past 1 to 2 weeks.      Consultants/Shared Management Plan:    Consultant: I have discussed the case with Gastroenterology on-call, Dr. Heath who agrees to consult on the patient.    Social Determinants of Health:    Patient has presented with family members who are responsible, reliable and will ensure follow up care.      Disposition and Care Coordination:    Send to OR/Endoscopy        Final diagnoses:   Esophageal stricture   Nausea and vomiting, unspecified vomiting type        ED Disposition     ED Disposition   Send to Specialty Department    Condition   --    Comment   Endoscopy with Dr. Heath             This medical record created using voice recognition software.    Documentation assistance provided by Ashley Ward acting as scribe for Ivana, Lorne Jeffries MD. Information recorded by the scribe was done at my direction and has been verified and validated by me.          Ashley Ward  03/31/23 9788       Alexx Louis MD  03/31/23 6098

## 2023-04-01 NOTE — H&P
"Pre Procedure History & Physical    Chief Complaint:   Dysphagia  Food impaction    Subjective     HPI:   As abovee    Past Medical History:   Past Medical History:   Diagnosis Date   • Arthritis    • Back injury    • Bronchitis    • Cataract    • CHF (congestive heart failure) (HCC)    • Chronic HFrEF (heart failure with reduced ejection fraction) 2/20/2022    NICM   • Circulatory disorder    • Colon polyp    • Enlarged prostate    • Genitourinary disorder     UTI SEVERAL YRS AGO INCONTINENCE   • Glaucoma    • Hemorrhoids    • Hypertension     ON MEDS   • Musculoskeletal disorder    • Myocardial infarction (HCC)    • Neurologic disorder     SHRAPNAL IN [place]   • Pneumonia    • Seizures (HCC)     WITH HEAD INJURY IN [place]   • Skin problem     BLISTERING FROM AGENT ORANGE AFTER [place]   • Syncopal episodes        Past Surgical History:  Past Surgical History:   Procedure Laterality Date   • ABDOMINAL SURGERY      INTESTINES TWISTED  9-17   • CARDIAC CATHETERIZATION  1970's    ARRYTHMIA   • CARDIAC DEFIBRILLATOR PLACEMENT     • CATARACT EXTRACTION Right     R EYE CATARACT SURGERY  HEAD INJURY   • CHOLECYSTECTOMY     • HEMORRHOIDECTOMY     • LEG SURGERY      HAD IN [place] TO REPAIR LEG INJURY   • MOUTH SURGERY      TOOTH EXTRACTION AND JAW BONE   • TURP / TRANSURETHRAL INCISION / DRAINAGE PROSTATE         Family History:  Family History   Problem Relation Age of Onset   • Asthma Mother    • Prostate cancer Father        Social History:   reports that he has never smoked. He has never used smokeless tobacco. He reports that he does not drink alcohol and does not use drugs.    Medications:   (Not in a hospital admission)      Allergies:  Patient has no known allergies.        Objective     Blood pressure (!) 151/107, pulse 80, temperature 97.8 °F (36.6 °C), temperature source Oral, resp. rate 20, height 177.8 cm (70\"), weight 55.5 kg (122 lb 5.7 oz), SpO2 93 %.    Physical Exam   Constitutional: Pt is oriented to " person, place, and time and well-developed, well-nourished, and in no distress.   Mouth/Throat: Oropharynx is clear and moist.   Neck: Normal range of motion.   Cardiovascular: Normal rate, regular rhythm and normal heart sounds.    Pulmonary/Chest: Effort normal and breath sounds normal.   Abdominal: Soft. Nontender  Skin: Skin is warm and dry.   Psychiatric: Mood, memory, affect and judgment normal.     Assessment & Plan     Diagnosis:  Dysphagia  Food impaction    Anticipated Surgical Procedure:  egd    The risks, benefits, and alternatives of this procedure have been discussed with the patient or the responsible party- the patient understands and agrees to proceed.

## 2023-04-02 NOTE — ED NOTES
Pt got back from CT and 02 sat dropped to 74% he was repositioned in bed and placed on 2 liters of 02 per nasal cannula and 02 sat is now 98%

## 2023-04-02 NOTE — ED NOTES
Family is aware of plan for transfer, pt in no distress, he is laying on bed resting with eyes closed. His mouth was swabbed with moistened swabs and he is aware he cant have anything to drink.

## 2023-04-02 NOTE — ED NOTES
Pt is back in bed and sleeping we are still awaiting accepting physician for pt.  Family updated. Pt awakens easily with verbal stimuli. He denies any need for nausea meds or pain meds at this time.

## 2023-04-02 NOTE — ED PROVIDER NOTES
Time: 10:16 AM EDT  Date of encounter:  4/2/2023  Independent Historian/Clinical History and Information was obtained by: Patient, Family and Chart  Chief Complaint: generalized weakness  History is limited by: N/A  Room number: 15/15     History of Present Illness:  HPI  Patient is a 89 y.o. year old male who presents to the Emergency Department via private car for evaluation of generalized weakness. Patient has family at bedside on initial evaluation. Patient has a medical history of chronic heart failure with reduced ejection fraction (CHFrEF), gastritis, esophagitis, recent yeast infection, implantable cardioverter-defibrillator (ICD), arthritis, cancer (prostate), cerebrovascular accident (CVA) on anticoagulant, chronic obstructive pulmonary disease (COPD), coronary artery disease (CAD) with history of myocardial infarction (MI), gastroesophageal reflux disease (GERD), hypertension (HTN) and seizure disorder. Patient has a surgical history of esophagogastroduodenoscopy (EGD), colectomy with end colostomy, exploratory laparotomy, Transurethral resection of the prostate (1991) and cholecystectomy. Patient has a social history of no clinical significance. Patient is retired [employer].    Patient was evaluated at [facility] several times (03/31/2023, 03/27/2023) for dysphagia symptoms. Patient was treated and discharged.    Patient is experiencing symptoms of generalized weakness with loss of appetite due to dysphagia that started approximately one week ago. Patient states they are in no pain and rates their pain level 0 out of 10 at rest and with movement or activity. Patient states no modifying factors. Patient denies symptoms of fever, chills, nausea, vomiting, diarrhea. All other systems reviews are negative.    Patient has tried prescription medication(s) with minimal relief to symptoms.      Patient Care Team  Primary Care Provider: Vannesa Hammond NP-C    Past Medical History:  No Known  Allergies  Past Medical History:   Diagnosis Date   • Arthritis    • Back injury    • Bronchitis    • Cataract    • CHF (congestive heart failure)    • Chronic HFrEF (heart failure with reduced ejection fraction) 2/20/2022    NICM   • Circulatory disorder    • Colon polyp    • Enlarged prostate    • Genitourinary disorder     UTI SEVERAL YRS AGO INCONTINENCE   • Glaucoma    • Hemorrhoids    • Hypertension     ON MEDS   • Musculoskeletal disorder    • Myocardial infarction    • Neurologic disorder     SHRAPNAL IN [place]   • Pneumonia    • Seizures     WITH HEAD INJURY IN [place]   • Skin problem     BLISTERING FROM AGENT ORANGE AFTER [place]   • Syncopal episodes      Past Surgical History:   Procedure Laterality Date   • ABDOMINAL SURGERY      INTESTINES TWISTED  9-17   • CARDIAC CATHETERIZATION  1970's    ARRYTHMIA   • CARDIAC DEFIBRILLATOR PLACEMENT     • CATARACT EXTRACTION Right     R EYE CATARACT SURGERY  HEAD INJURY   • CHOLECYSTECTOMY     • ENDOSCOPY N/A 3/31/2023    Procedure: ESOPHAGOGASTRODUODENOSCOPY WITH BIOPSIES, BALLOON DILITATION 15-17.5;  Surgeon: Lorne Heath MD;  Location: [facility] MAIN OR;  Service: Gastroenterology;  Laterality: N/A;  CANDIDA  ESOPHAGITIS, LARGE DIALTED STOMACH, ESOPHAGEAL STRICTURE   • HEMORRHOIDECTOMY     • LEG SURGERY      HAD IN [place] TO REPAIR LEG INJURY   • MOUTH SURGERY      TOOTH EXTRACTION AND JAW BONE   • TURP / TRANSURETHRAL INCISION / DRAINAGE PROSTATE       Family History   Problem Relation Age of Onset   • Asthma Mother    • Prostate cancer Father        Home Medications:  Prior to Admission medications    Medication Sig Start Date End Date Taking? Authorizing Provider   Acetaminophen Extra Strength 500 MG tablet As Needed. 12/21/21   ProviderSheila MD   albuterol sulfate  (90 Base) MCG/ACT inhaler As Needed.    Provider, MD Sheila   artificial tears (PURALUBE) 85-15 % ointment ophthalmic ointment Puralube 85-15 % ophthalmic (eye) ointment  apply a small amount to affected eye  once a day (at bedtime)   Active    Sheila Blank MD   bicalutamide (CASODEX) 50 MG chemo tablet  6/27/22   Sheila Blank MD   Calcium Carb-Cholecalciferol (OYSCO 500 + D) 500-200 MG-UNIT tablet Oysco 500/D 500 mg(1,250mg) -200 unit oral tablet take 1 tablet by oral route 2 times a day   Active    Sheila Blank MD   carboxymethylcellulose (REFRESH PLUS) 0.5 % solution 1 drop.    Sheila Blank MD   carvedilol (COREG) 3.125 MG tablet Take 1 tablet by mouth 2 (Two) Times a Day. 1/6/22   Sheila Blank MD   chlorhexidine (PERIDEX) 0.12 % solution  11/23/21   Sheila Blank MD   cholecalciferol (VITAMIN D3) 25 MCG (1000 UT) tablet  6/28/22   Sheila Blank MD   Dorzolamide HCl-Timolol Mal PF (Cosopt PF) 22.3-6.8 MG/ML ophthalmic solution 1 drop.    Sheila Blank MD   dorzolamide-timolol (COSOPT) 22.3-6.8 MG/ML ophthalmic solution  4/15/22   Sheila Blank MD   fluconazole (Diflucan) 100 MG tablet Take 1 tablet by mouth Daily for 14 days. 3/31/23 4/14/23  Lorne Heath MD   fluticasone (FLONASE) 50 MCG/ACT nasal spray fluticasone 50 mcg/actuation nasal spray,suspension spray 2 sprays (100 mcg) in each nostril by intranasal route once daily   Active    Sheila Blank MD   furosemide (LASIX) 80 MG tablet  4/13/22   Sheila Blank MD   loratadine (CLARITIN) 10 MG tablet Daily. 12/21/21   Sheila Blank MD   megestrol (MEGACE) 20 MG tablet  6/29/22   Sheila Blank MD   Nutritional Supplements (ENSURE COMPLETE PO) Take  by mouth 2 (Two) Times a Day As Needed.    Sheila Blank MD   potassium chloride (MICRO-K) 10 MEQ CR capsule Take 1 capsule by mouth Daily. 4/1/21   Sheila Blank MD   sacubitril-valsartan (Entresto) 24-26 MG tablet Take 1 tablet by mouth 2 (Two) Times a Day. 2/16/23   Joyce Lopez APRN   Stimulant Laxative 8.6-50 MG per tablet  6/28/22    "Provider, Historical, MD        Social History:  Social History     Tobacco Use   • Smoking status: Never   • Smokeless tobacco: Never   Vaping Use   • Vaping Use: Never used   Substance Use Topics   • Alcohol use: Never   • Drug use: Never       Review of Systems:   Review of Systems   Constitutional: Positive for appetite change (loss due to dysphagia symptoms).   HENT: Negative.    Eyes: Negative.    Respiratory: Negative.    Cardiovascular: Negative.    Gastrointestinal: Negative.    Endocrine: Negative.    Genitourinary: Negative.    Musculoskeletal: Negative.    Skin: Negative.    Allergic/Immunologic: Negative.    Neurological: Positive for weakness.   Hematological: Negative.    Psychiatric/Behavioral: Negative.    All other systems reviewed and are negative.         Physical Exam:   /91   Pulse 111   Temp 97.7 °F (36.5 °C) (Oral)   Resp 16   Ht 175.3 cm (69\")   SpO2 92%   BMI 18.07 kg/m²     Physical Exam  Vitals and nursing note reviewed.   Constitutional:       General: He is not in acute distress.  HENT:      Head: Normocephalic and atraumatic.   Eyes:      Extraocular Movements: Extraocular movements intact.   Cardiovascular:      Rate and Rhythm: Normal rate and regular rhythm.   Pulmonary:      Effort: Pulmonary effort is normal. No respiratory distress.      Breath sounds: Normal breath sounds.   Abdominal:      General: Abdomen is flat.      Palpations: Abdomen is soft.      Tenderness: There is no abdominal tenderness.   Musculoskeletal:         General: Normal range of motion.      Cervical back: Normal range of motion and neck supple.   Skin:     General: Skin is warm and dry.      Capillary Refill: Capillary refill takes less than 2 seconds.   Neurological:      Mental Status: He is alert and oriented to person, place, and time. Mental status is at baseline.                Procedures:  Procedures    Medical Decision Making:    Comorbidities that affect care:    chronic heart failure " with reduced ejection fraction (CHFrEF), gastritis, esophagitis, recent yeast infection, implantable cardioverter-defibrillator (ICD), arthritis, cancer (prostate), cerebrovascular accident (CVA) on anticoagulant, chronic obstructive pulmonary disease (COPD), coronary artery disease (CAD) with history of myocardial infarction (MI), gastroesophageal reflux disease (GERD), hypertension (HTN) and seizure disorder.    External Notes reviewed:    Previous Operation Note: esophagogastroduodenoscopy (EGD) on Friday (03/31/2023) and Previous ED Note: (03/31/2023) and (03/27/2023)    The following orders were placed and all results were independently analyzed by me:  Orders Placed This Encounter   Procedures   • XR Chest 1 View   • CT Abdomen Pelvis With Contrast   • [place] Draw   • Comprehensive Metabolic Panel   • Single High Sensitivity Troponin T   • Magnesium   • CBC Auto Differential   • Undress & Gown   • Continuous Pulse Oximetry   • Vital Signs   • POC Glucose Once   • POC Glucose Once   • ECG 12 Lead ED Triage Standing Order; Weak / Dizzy / AMS   • SCANNED - TELEMETRY     • SCANNED - TELEMETRY     • CBC & Differential   • Green Top (Gel)   • Lavender Top   • Gold Top - SST   • Light Blue Top       Medications Given in the Emergency Department:  Medications   sodium chloride 0.9 % bolus 1,000 mL (0 mL Intravenous Stopped 4/2/23 1115)   ondansetron (ZOFRAN) injection 4 mg (4 mg Intravenous Given 4/2/23 1045)   iopamidol (ISOVUE-370) 76 % injection 100 mL (100 mL Intravenous Given 4/2/23 1151)   piperacillin-tazobactam (ZOSYN) 4.5 g/100 mL 0.9% NS IVPB (mbp) (0 g Intravenous Stopped 4/2/23 2034)   morphine injection 2 mg (2 mg Intravenous Given 4/2/23 2311)   benzocaine (HURRICAINE) 20 % liquid solution 1 spray (1 spray Mouth/Throat Given 4/2/23 2314)       ED Course:  ED Course as of 04/07/23 1816   Sun Apr 02, 2023   1000 EKG:    Rhythm: Ventricular paced rhythm  Rate: 115  Intervals: Right bundle branch  block  T-wave: Nonspecific changes  ST Segment: Nonspecific changes    EKG Comparison: 3/31/2023 similar    Interpreted by me   [NL]      ED Course User Index  [NL] Matt Maier DO       Labs:  Lab Results (last 24 hours)     ** No results found for the last 24 hours. **           Imaging:  No Radiology Exams Resulted Within Past 24 Hours    Differential Diagnosis and Discussion:    Sore Throat: Differential diagnosis includes but is not limited to bacterial infection, viral infection, inhaled irritants, sinus drainage, thyroiditis, epiglottitis, and retropharyngeal abscess.  Weakness: Based on the patient's history, signs, and symptoms, the diffential diagnosis includes but is not limited to meningitis, stroke, sepsis, subarachnoid hemorrhage, intracranial bleeding, encephalitis, acute uti, dehydration, MS, myasthenia gravis, Guillan Plato, migraine variant, neuromuscular disorders vertigo, electrolyte imbalance, and metabolic disorders.    All labs were reviewed and interpreted by me.  All X-rays were independently reviewed by me.  EKG was interpreted by me.    [facility]     Critical Care Note: Total Critical Care time of 35 minutes. Total critical care time documented does not include time spent on separately billed procedures for services of nurses or physician assistants. I personally saw and examined the patient. I have reviewed all diagnostic interpretations and treatment plans as written. I was present for the key portions of any procedures performed and the inclusive time noted in any critical care statement. Critical care time includes patient management by me, time spent at the patients bedside,  time to review lab and imaging results, discussing patient care, documentation in the medical record, and time spent with family or caregiver.      Patient transferred to [facility] for surgical evaluation.    Patient Care Considerations:        Consultants/Shared Management Plan:  Patient discussed with   Sincere, general surgery and Dr. Heath, GI who both recommend transfer.      Social Determinants of Health:    Patient has presented with family members who are responsible, reliable and will ensure follow up care.    Disposition and Care Coordination:    Transferred: Through independent evaluation of the patient's history, physical, and imperical data, the patient meets criteria to be transferred to another hospital for evaluation/admission.      Final diagnoses:   Volvulus of stomach        ED Disposition     ED Disposition   Transfer to Another Facility     Condition   --    Comment   --             This medical record created using voice recognition software.    Documentation assistance provided by Maria Guadalupe Morris acting as scribes for Matt Maier DO.Information recorded by the scribe was verified and validated at my direction.     Maria Guadalupe Morris  04/02/23 1055       Maria Guadalupe Morris  04/02/23 1055       Matt Maier DO  04/07/23 2501

## 2023-04-02 NOTE — ED NOTES
Lab called stating his CMP had hemolyzed and would need a redraw, ED phlebotomy notified. Pt has been having dysrhythmias, Dr. Maier is aware and I discussed with patient if he had a DNR or living will and he stated to me in front of family he did not want CPR or intubation. Pt is alert and oriented at this time. He also was given CT oral contrast to drink for CT and he can't tolerate it each time he takes a drink he vomits. Dr. Maier advised to do it without oral contrast and CT notified.

## 2023-04-05 NOTE — TELEPHONE ENCOUNTER
Spoke to pt's POA (paper work under media) Janine Mitchell. Informed of [facility] result note and recommendations. Verified understanding.   States pt was admitted to [facility] and is still currently there. Educated to contact office when d/c for f/u.

## 2023-04-05 NOTE — TELEPHONE ENCOUNTER
----- Message from RICHARD Butler sent at 4/4/2023  4:20 PM EDT -----  Active esophagitis with Candida Ensure that the patient is taking Diflucan 100 mg daily for 2 weeks.

## (undated) DEVICE — DEV INFL CRE STERIFLATE 60CC DISP

## (undated) DEVICE — ESOPHAGEAL BALLOON DILATATION CATHETER: Brand: CRE FIXED WIRE

## (undated) DEVICE — SOL IRRG H2O PL/BG 1000ML STRL

## (undated) DEVICE — Device

## (undated) DEVICE — LINER SURG CANSTR SXN S/RIGD 1500CC

## (undated) DEVICE — Device: Brand: DEFENDO AIR/WATER/SUCTION AND BIOPSY VALVE

## (undated) DEVICE — SOLIDIFIER LIQLOC PLS 1500CC BT

## (undated) DEVICE — CONN JET HYDRA H20 AUXILIARY DISP

## (undated) DEVICE — SINGLE-USE BIOPSY FORCEPS: Brand: RADIAL JAW 4

## (undated) DEVICE — BLCK/BITE BLOX WO/DENTL/RIM W/STRAP 54F